# Patient Record
Sex: MALE | Race: WHITE | NOT HISPANIC OR LATINO | Employment: OTHER | URBAN - METROPOLITAN AREA
[De-identification: names, ages, dates, MRNs, and addresses within clinical notes are randomized per-mention and may not be internally consistent; named-entity substitution may affect disease eponyms.]

---

## 2024-05-08 ENCOUNTER — APPOINTMENT (OUTPATIENT)
Dept: RADIOLOGY | Facility: HOSPITAL | Age: 74
DRG: 439 | End: 2024-05-08
Payer: MEDICARE

## 2024-05-08 ENCOUNTER — APPOINTMENT (OUTPATIENT)
Dept: CARDIOLOGY | Facility: HOSPITAL | Age: 74
DRG: 439 | End: 2024-05-08
Payer: MEDICARE

## 2024-05-08 ENCOUNTER — HOSPITAL ENCOUNTER (INPATIENT)
Facility: HOSPITAL | Age: 74
LOS: 2 days | Discharge: HOME | DRG: 439 | End: 2024-05-10
Attending: STUDENT IN AN ORGANIZED HEALTH CARE EDUCATION/TRAINING PROGRAM | Admitting: INTERNAL MEDICINE
Payer: MEDICARE

## 2024-05-08 DIAGNOSIS — R10.84 GENERALIZED ABDOMINAL PAIN: ICD-10-CM

## 2024-05-08 DIAGNOSIS — R07.9 CHEST PAIN, UNSPECIFIED TYPE: Primary | ICD-10-CM

## 2024-05-08 DIAGNOSIS — K85.90 ACUTE PANCREATITIS, UNSPECIFIED COMPLICATION STATUS, UNSPECIFIED PANCREATITIS TYPE (HHS-HCC): ICD-10-CM

## 2024-05-08 PROBLEM — R10.9 ABDOMINAL PAIN: Status: ACTIVE | Noted: 2024-05-08

## 2024-05-08 LAB
ALBUMIN SERPL-MCNC: 4 G/DL (ref 3.5–5)
ALP BLD-CCNC: 87 U/L (ref 35–125)
ALT SERPL-CCNC: 20 U/L (ref 5–40)
ANION GAP SERPL CALC-SCNC: 12 MMOL/L
AST SERPL-CCNC: 16 U/L (ref 5–40)
BASOPHILS # BLD AUTO: 0.02 X10*3/UL (ref 0–0.1)
BASOPHILS NFR BLD AUTO: 0.3 %
BILIRUB DIRECT SERPL-MCNC: <0.2 MG/DL (ref 0–0.2)
BILIRUB SERPL-MCNC: 0.3 MG/DL (ref 0.1–1.2)
BUN SERPL-MCNC: 21 MG/DL (ref 8–25)
CALCIUM SERPL-MCNC: 9.6 MG/DL (ref 8.5–10.4)
CHLORIDE SERPL-SCNC: 97 MMOL/L (ref 97–107)
CO2 SERPL-SCNC: 26 MMOL/L (ref 24–31)
CREAT SERPL-MCNC: 1.6 MG/DL (ref 0.4–1.6)
EGFRCR SERPLBLD CKD-EPI 2021: 45 ML/MIN/1.73M*2
EOSINOPHIL # BLD AUTO: 0.31 X10*3/UL (ref 0–0.4)
EOSINOPHIL NFR BLD AUTO: 5 %
ERYTHROCYTE [DISTWIDTH] IN BLOOD BY AUTOMATED COUNT: 13.3 % (ref 11.5–14.5)
GLUCOSE SERPL-MCNC: 114 MG/DL (ref 65–99)
HCT VFR BLD AUTO: 44 % (ref 41–52)
HGB BLD-MCNC: 14.2 G/DL (ref 13.5–17.5)
IMM GRANULOCYTES # BLD AUTO: 0.02 X10*3/UL (ref 0–0.5)
IMM GRANULOCYTES NFR BLD AUTO: 0.3 % (ref 0–0.9)
LIPASE SERPL-CCNC: 19 U/L (ref 16–63)
LYMPHOCYTES # BLD AUTO: 1.33 X10*3/UL (ref 0.8–3)
LYMPHOCYTES NFR BLD AUTO: 21.6 %
MCH RBC QN AUTO: 30 PG (ref 26–34)
MCHC RBC AUTO-ENTMCNC: 32.3 G/DL (ref 32–36)
MCV RBC AUTO: 93 FL (ref 80–100)
MONOCYTES # BLD AUTO: 0.65 X10*3/UL (ref 0.05–0.8)
MONOCYTES NFR BLD AUTO: 10.5 %
NEUTROPHILS # BLD AUTO: 3.84 X10*3/UL (ref 1.6–5.5)
NEUTROPHILS NFR BLD AUTO: 62.3 %
NRBC BLD-RTO: 0 /100 WBCS (ref 0–0)
PLATELET # BLD AUTO: 139 X10*3/UL (ref 150–450)
POTASSIUM SERPL-SCNC: 4.5 MMOL/L (ref 3.4–5.1)
PROT SERPL-MCNC: 6.8 G/DL (ref 5.9–7.9)
RBC # BLD AUTO: 4.74 X10*6/UL (ref 4.5–5.9)
SODIUM SERPL-SCNC: 135 MMOL/L (ref 133–145)
TROPONIN T SERPL-MCNC: 25 NG/L
TROPONIN T SERPL-MCNC: 26 NG/L
WBC # BLD AUTO: 6.2 X10*3/UL (ref 4.4–11.3)

## 2024-05-08 PROCEDURE — 82248 BILIRUBIN DIRECT: CPT | Performed by: STUDENT IN AN ORGANIZED HEALTH CARE EDUCATION/TRAINING PROGRAM

## 2024-05-08 PROCEDURE — 99285 EMERGENCY DEPT VISIT HI MDM: CPT | Mod: 25

## 2024-05-08 PROCEDURE — 85025 COMPLETE CBC W/AUTO DIFF WBC: CPT | Performed by: STUDENT IN AN ORGANIZED HEALTH CARE EDUCATION/TRAINING PROGRAM

## 2024-05-08 PROCEDURE — 93005 ELECTROCARDIOGRAM TRACING: CPT

## 2024-05-08 PROCEDURE — 93010 ELECTROCARDIOGRAM REPORT: CPT | Performed by: INTERNAL MEDICINE

## 2024-05-08 PROCEDURE — 83690 ASSAY OF LIPASE: CPT | Performed by: STUDENT IN AN ORGANIZED HEALTH CARE EDUCATION/TRAINING PROGRAM

## 2024-05-08 PROCEDURE — 74177 CT ABD & PELVIS W/CONTRAST: CPT

## 2024-05-08 PROCEDURE — 1200000002 HC GENERAL ROOM WITH TELEMETRY DAILY

## 2024-05-08 PROCEDURE — 71046 X-RAY EXAM CHEST 2 VIEWS: CPT

## 2024-05-08 PROCEDURE — 71046 X-RAY EXAM CHEST 2 VIEWS: CPT | Performed by: RADIOLOGY

## 2024-05-08 PROCEDURE — 36415 COLL VENOUS BLD VENIPUNCTURE: CPT | Performed by: STUDENT IN AN ORGANIZED HEALTH CARE EDUCATION/TRAINING PROGRAM

## 2024-05-08 PROCEDURE — 84484 ASSAY OF TROPONIN QUANT: CPT | Performed by: STUDENT IN AN ORGANIZED HEALTH CARE EDUCATION/TRAINING PROGRAM

## 2024-05-08 PROCEDURE — 2550000001 HC RX 255 CONTRASTS: Performed by: STUDENT IN AN ORGANIZED HEALTH CARE EDUCATION/TRAINING PROGRAM

## 2024-05-08 PROCEDURE — 82374 ASSAY BLOOD CARBON DIOXIDE: CPT | Performed by: STUDENT IN AN ORGANIZED HEALTH CARE EDUCATION/TRAINING PROGRAM

## 2024-05-08 RX ORDER — SODIUM CHLORIDE 9 MG/ML
100 INJECTION, SOLUTION INTRAVENOUS CONTINUOUS
Status: DISCONTINUED | OUTPATIENT
Start: 2024-05-08 | End: 2024-05-09

## 2024-05-08 RX ORDER — ONDANSETRON HYDROCHLORIDE 2 MG/ML
4 INJECTION, SOLUTION INTRAVENOUS EVERY 6 HOURS PRN
Status: DISCONTINUED | OUTPATIENT
Start: 2024-05-08 | End: 2024-05-10 | Stop reason: HOSPADM

## 2024-05-08 RX ORDER — PANTOPRAZOLE SODIUM 40 MG/10ML
40 INJECTION, POWDER, LYOPHILIZED, FOR SOLUTION INTRAVENOUS DAILY
Status: DISCONTINUED | OUTPATIENT
Start: 2024-05-09 | End: 2024-05-10

## 2024-05-08 RX ORDER — MORPHINE SULFATE 2 MG/ML
2 INJECTION, SOLUTION INTRAMUSCULAR; INTRAVENOUS EVERY 4 HOURS PRN
Status: DISCONTINUED | OUTPATIENT
Start: 2024-05-08 | End: 2024-05-10 | Stop reason: HOSPADM

## 2024-05-08 RX ORDER — ERGOCALCIFEROL (VITAMIN D2) 200 MCG/ML
50000 DROPS ORAL
COMMUNITY

## 2024-05-08 RX ADMIN — IOHEXOL 75 ML: 350 INJECTION, SOLUTION INTRAVENOUS at 20:39

## 2024-05-08 ASSESSMENT — PAIN - FUNCTIONAL ASSESSMENT
PAIN_FUNCTIONAL_ASSESSMENT: 0-10
PAIN_FUNCTIONAL_ASSESSMENT: 0-10

## 2024-05-08 ASSESSMENT — PAIN SCALES - GENERAL
PAINLEVEL_OUTOF10: 0 - NO PAIN

## 2024-05-08 NOTE — ED PROVIDER NOTES
HPI   Chief Complaint   Patient presents with    Chest Pain       Patient is a 74-year-old male presenting to the emergency department for evaluation of chest pain.  Patient was reportedly at urgent care complaining of chest pain for the centimeter further evaluation.  Patient is denying any chest pain at this time.  He is a poor historian due to being mentally disabled and there is no one at bedside to help provide history.                            Bela Coma Scale Score: 14                     Patient History   No past medical history on file.  No past surgical history on file.  No family history on file.  Social History     Tobacco Use    Smoking status: Not on file    Smokeless tobacco: Not on file   Substance Use Topics    Alcohol use: Not on file    Drug use: Not on file       Physical Exam   ED Triage Vitals [05/08/24 1813]   Temp Heart Rate Respirations BP   -- 75 18 135/74      Pulse Ox Temp src Heart Rate Source Patient Position   96 % -- Monitor --      BP Location FiO2 (%)     -- --       Physical Exam  Vitals and nursing note reviewed.   Constitutional:       General: He is not in acute distress.     Appearance: He is well-developed. He is not ill-appearing or toxic-appearing.   HENT:      Head: Normocephalic and atraumatic.   Eyes:      Pupils: Pupils are equal, round, and reactive to light.   Cardiovascular:      Rate and Rhythm: Normal rate and regular rhythm.      Pulses:           Radial pulses are 2+ on the right side and 2+ on the left side.      Heart sounds: Normal heart sounds. No murmur heard.     No friction rub. No gallop.   Pulmonary:      Effort: Pulmonary effort is normal.      Breath sounds: Normal breath sounds. No decreased breath sounds, wheezing, rhonchi or rales.   Abdominal:      Palpations: Abdomen is soft.      Tenderness: There is no abdominal tenderness.   Musculoskeletal:         General: Normal range of motion.      Cervical back: Normal range of motion.      Right lower  leg: No edema.      Left lower leg: No edema.      Comments: Moving all extremities   Skin:     General: Skin is warm and dry.   Neurological:      General: No focal deficit present.      Mental Status: He is alert.      Comments: Cognitively impaired at baseline however is pleasant   Psychiatric:         Mood and Affect: Mood normal.         Behavior: Behavior normal.         ED Course & [facility]   ED Course as of 05/08/24 2106   Wed May 08, 2024   1925 EKG interpretation: Normal sinus rhythm, rate 67.  Leftward axis.  No ST or T wave abnormalities.  Inferior Q waves.  Compared with previous EKG dated 20 August 2020, no changes are seen. [ML]   2104 Troponin T Series, High Sensitivity (0, 2HR, 6HR)(!!) [AJ]      ED Course User Index  [AJ] Rosa El PA-C  [ML] Robson Velasco MD         Diagnoses as of 05/08/24 2106   Generalized abdominal pain       Medical Decision Making  **Disclaimer parts of this chart have been completed using voice recognition software. Please excuse any errors of transcription.     Patient seen in conjunction with attending physician Dr. Velasco    HPI: Detailed above.    Exam: A medically appropriate exam performed, outlined above, given the known history and presentation.    History obtained from: Patient    EKG: Reviewed and interpreted by my attending physician    Labs/Diagnostics:  Labs Reviewed   CBC WITH AUTO DIFFERENTIAL - Abnormal       Result Value    WBC 6.2      nRBC 0.0      RBC 4.74      Hemoglobin 14.2      Hematocrit 44.0      MCV 93      MCH 30.0      MCHC 32.3      RDW 13.3      Platelets 139 (*)     Neutrophils % 62.3      Immature Granulocytes %, Automated 0.3      Lymphocytes % 21.6      Monocytes % 10.5      Eosinophils % 5.0      Basophils % 0.3      Neutrophils Absolute 3.84      Immature Granulocytes Absolute, Automated 0.02      Lymphocytes Absolute 1.33      Monocytes Absolute 0.65      Eosinophils Absolute 0.31      Basophils Absolute 0.02     BASIC METABOLIC PANEL  - Abnormal    Glucose 114 (*)     Sodium 135      Potassium 4.5      Chloride 97      Bicarbonate 26      Urea Nitrogen 21      Creatinine 1.60      eGFR 45 (*)     Calcium 9.6      Anion Gap 12     SERIAL TROPONIN, INITIAL ([facility]) - Abnormal    Troponin T, High Sensitivity 25 (*)    LIPASE - Normal    Lipase 19     HEPATIC FUNCTION PANEL - Normal    AST 16      ALT 20      Alkaline Phosphatase 87      Bilirubin, Total 0.3      Bilirubin, Direct <0.2      Total Protein 6.8      Albumin 4.0     TROPONIN T SERIES, HIGH SENSITIVITY (0, 2 HR, 6 HR)    Narrative:     The following orders were created for panel order Troponin T Series, High Sensitivity (0, 2HR, 6HR).  Procedure                               Abnormality         Status                     ---------                               -----------         ------                     Serial Troponin, Initial...[[id number]]  Abnormal            Final result               Serial Troponin, 2 Hour ...[[id number]]                      In process                 Serial Troponin, 6 Hour ...[[id number]]                                                   Please view results for these tests on the individual orders.   SERIAL TROPONIN,  2 HOUR ([facility])   SERIAL TROPONIN, 6 HOUR ([facility])     XR chest 2 views   Final Result   Mild basilar subsegmental atelectasis.        MACRO:   None        Signed by: Conrado Haile 5/8/2024 7:13 PM   Dictation workstation:   [id number]      CT abdomen pelvis w IV contrast    (Results Pending)     EMERGENCY DEPARTMENT COURSE and DIFFERENTIAL DIAGNOSIS/MDM:  Patient is a 74-year-old male presenting to the emergency department for evaluation of chest pain.  On physical exam vital signs remarkable for systolic hypertension but otherwise stable patient is in no acute distress.  He is not complaining of any chest pain at this time however he is a poor historian.  Diagnostic labs and imaging ordered.  Chest x-ray showed mild basilar subsegmental atelectasis.   BMP showed no electrolyte abnormalities.  CBC showed no leukocytosis or anemia however did show platelets of 139.  Initial troponin 25.  Dr. Velasco spoke with the patient and he likely was explaining some pain in the abdominal region therefore CT of the abdomen and pelvis was ordered.  Lipase was also ordered which came back normal.  Repeat troponin and CT is pending at the time my departure.  Suspect if patient's second troponin comes back close to the same value as the initial troponin and there is no abnormality on CT scan that patient will be discharged to follow-up with primary care physician and cardiology outpatient.  Continuation of care as well as final disposition will be determined by attending physician in the emergency department.    Vitals:    Vitals:    05/08/24 1813   BP: 135/74   Pulse: 75   Resp: 18   SpO2: 96%   Weight: 104 kg (230 lb)   Height: 1.829 m (6')     History Limited by:    Cognitive impairment    Independent history obtained from:    Caregiver      External records reviewed:    Outpatient Note cardiology to determine patient's past medical history  Mr. Underwood is a 73 year old male with history of Schizophrenia, Parkinsonism, developmental delay, who presents for follow-up of pericardial effusion.      Euvolemic without abnormal auscultatory findings.      Last ECG with sinus bradycardia, old inferior.       Last TTE normal biventricular size and function. Interval resolution of pericardial effusion.         PLAN:  History of Pericardial effusion.   -  Denies chest pain.   - checking echocardiogram and ECG.     2. HTN  - continue current medications  - salt intake up to 2gr per day.   - weekly BP checks.  - Encourage aerobic activity.      RTC in one year with ECG and echocardiogram.    Diagnostics interpreted by me:    Xrays - see my independent interpretation in MDM      Discussions with other clinicians:    None      Chronic conditions impacting care:    Cognitively  impaired      Social determinants of health affecting care:    None    Diagnostic tests considered but not performed: None    ED Medications managed:    Medications   iohexol (OMNIPaque) 350 mg iodine/mL solution 75 mL (75 mL intravenous Given 5/8/24 2039)         Prescription drugs considered:    None      Procedure  Procedures     Rosa El PA-C  05/08/24 2035       Rosa El PA-C  05/08/24 2106

## 2024-05-08 NOTE — ED PROVIDER NOTES
Supervisory note:  Patient seen in conjunction with LACHELLE Francisco.  Patient presents with chest pain/abdominal pain.  He reportedly was having pain in the chest earlier today.  When asked patient about it, he points to his abdomen.  He runs his hand across his abdomen, saying there was pain earlier.  He reports that he does not have pain currently.  On examination, there is mild tenderness to palpation of the right lower quadrant, however examination is somewhat limited by BMI.  Cardiac and respiratory rates are unremarkable.    Troponin x 2 stable.  EKG without ischemic changes.  Laboratory studies otherwise unremarkable.  CAT scan reveals likely pancreatitis.  Patient is very poor historian and in the setting, patient accepted to hospitalist service for further management for pancreatitis.    I personally saw the patient and made/approved the management plan and take responsiblity for the patient management.  Parts of this chart were completed with dictation software, please excuse any errors in transcription.     Robson Velasco MD  05/08/24 3472

## 2024-05-08 NOTE — Clinical Note
One 200 mg vial propofol pulled from Twitter under this RN per verbal order from Dr. WENDIE Baker for procedure.  Count 10 in drawer prior to removal of vial.

## 2024-05-08 NOTE — ED TRIAGE NOTES
Ems called to urgent care for a male c/o chest pain. Pt brought here from there, but no chest pain upon arrival. Pt is developmentally delayed but alert and oriented per normal. Pt has no complaints at this time.

## 2024-05-09 ENCOUNTER — APPOINTMENT (OUTPATIENT)
Dept: CARDIOLOGY | Facility: HOSPITAL | Age: 74
DRG: 439 | End: 2024-05-09
Payer: MEDICARE

## 2024-05-09 ENCOUNTER — APPOINTMENT (OUTPATIENT)
Dept: RADIOLOGY | Facility: HOSPITAL | Age: 74
DRG: 439 | End: 2024-05-09
Payer: MEDICARE

## 2024-05-09 LAB
AORTIC VALVE PEAK VELOCITY: 1.17 M/S
ATRIAL RATE: 67 BPM
AV PEAK GRADIENT: 5.5 MMHG
AVA (PEAK VEL): 4.58 CM2
EJECTION FRACTION APICAL 4 CHAMBER: 68.3
GLUCOSE BLD MANUAL STRIP-MCNC: 117 MG/DL (ref 74–99)
GLUCOSE BLD MANUAL STRIP-MCNC: 119 MG/DL (ref 74–99)
GLUCOSE BLD MANUAL STRIP-MCNC: 121 MG/DL (ref 74–99)
GLUCOSE BLD MANUAL STRIP-MCNC: 134 MG/DL (ref 74–99)
GLUCOSE BLD MANUAL STRIP-MCNC: 152 MG/DL (ref 74–99)
LEFT VENTRICLE INTERNAL DIMENSION DIASTOLE: 4.46 CM (ref 3.5–6)
LEFT VENTRICULAR OUTFLOW TRACT DIAMETER: 2.2 CM
MITRAL VALVE E/A RATIO: 0.71
MITRAL VALVE E/E' RATIO: 14.16
P AXIS: 13 DEGREES
P OFFSET: 173 MS
P ONSET: 114 MS
PR INTERVAL: 196 MS
PSA SERPL-MCNC: 2.6 NG/ML
Q ONSET: 212 MS
QRS COUNT: 11 BEATS
QRS DURATION: 116 MS
QT INTERVAL: 404 MS
QTC CALCULATION(BAZETT): 426 MS
QTC FREDERICIA: 419 MS
R AXIS: -47 DEGREES
RIGHT VENTRICLE FREE WALL PEAK S': 11.9 CM/S
T AXIS: 14 DEGREES
T OFFSET: 414 MS
TRICUSPID ANNULAR PLANE SYSTOLIC EXCURSION: 1.8 CM
TROPONIN T SERPL-MCNC: 29 NG/L
VENTRICULAR RATE: 67 BPM

## 2024-05-09 PROCEDURE — 84484 ASSAY OF TROPONIN QUANT: CPT | Performed by: STUDENT IN AN ORGANIZED HEALTH CARE EDUCATION/TRAINING PROGRAM

## 2024-05-09 PROCEDURE — 93005 ELECTROCARDIOGRAM TRACING: CPT

## 2024-05-09 PROCEDURE — 2500000006 HC RX 250 W HCPCS SELF ADMINISTERED DRUGS (ALT 637 FOR ALL PAYERS): Mod: MUE | Performed by: INTERNAL MEDICINE

## 2024-05-09 PROCEDURE — 76376 3D RENDER W/INTRP POSTPROCES: CPT | Performed by: RADIOLOGY

## 2024-05-09 PROCEDURE — 74183 MRI ABD W/O CNTR FLWD CNTR: CPT | Performed by: RADIOLOGY

## 2024-05-09 PROCEDURE — 93306 TTE W/DOPPLER COMPLETE: CPT | Performed by: INTERNAL MEDICINE

## 2024-05-09 PROCEDURE — C9113 INJ PANTOPRAZOLE SODIUM, VIA: HCPCS | Performed by: INTERNAL MEDICINE

## 2024-05-09 PROCEDURE — 99222 1ST HOSP IP/OBS MODERATE 55: CPT | Performed by: INTERNAL MEDICINE

## 2024-05-09 PROCEDURE — A9575 INJ GADOTERATE MEGLUMI 0.1ML: HCPCS | Performed by: INTERNAL MEDICINE

## 2024-05-09 PROCEDURE — 93306 TTE W/DOPPLER COMPLETE: CPT

## 2024-05-09 PROCEDURE — 36415 COLL VENOUS BLD VENIPUNCTURE: CPT | Performed by: STUDENT IN AN ORGANIZED HEALTH CARE EDUCATION/TRAINING PROGRAM

## 2024-05-09 PROCEDURE — 82947 ASSAY GLUCOSE BLOOD QUANT: CPT

## 2024-05-09 PROCEDURE — 2550000001 HC RX 255 CONTRASTS: Performed by: INTERNAL MEDICINE

## 2024-05-09 PROCEDURE — 2500000004 HC RX 250 GENERAL PHARMACY W/ HCPCS (ALT 636 FOR OP/ED): Performed by: INTERNAL MEDICINE

## 2024-05-09 PROCEDURE — 84153 ASSAY OF PSA TOTAL: CPT | Performed by: NURSE PRACTITIONER

## 2024-05-09 PROCEDURE — 2500000001 HC RX 250 WO HCPCS SELF ADMINISTERED DRUGS (ALT 637 FOR MEDICARE OP)

## 2024-05-09 PROCEDURE — 2500000001 HC RX 250 WO HCPCS SELF ADMINISTERED DRUGS (ALT 637 FOR MEDICARE OP): Performed by: INTERNAL MEDICINE

## 2024-05-09 PROCEDURE — 1200000002 HC GENERAL ROOM WITH TELEMETRY DAILY

## 2024-05-09 PROCEDURE — 74183 MRI ABD W/O CNTR FLWD CNTR: CPT

## 2024-05-09 RX ORDER — INSULIN LISPRO 100 [IU]/ML
0-10 INJECTION, SOLUTION INTRAVENOUS; SUBCUTANEOUS
Status: DISCONTINUED | OUTPATIENT
Start: 2024-05-09 | End: 2024-05-10 | Stop reason: HOSPADM

## 2024-05-09 RX ORDER — METFORMIN HYDROCHLORIDE 500 MG/1
1 TABLET ORAL
COMMUNITY
Start: 2023-10-02

## 2024-05-09 RX ORDER — METOPROLOL TARTRATE 50 MG/1
50 TABLET ORAL 2 TIMES DAILY
Status: DISCONTINUED | OUTPATIENT
Start: 2024-05-09 | End: 2024-05-10 | Stop reason: HOSPADM

## 2024-05-09 RX ORDER — VALPROIC ACID 250 MG/5ML
1000 SOLUTION ORAL NIGHTLY
Status: DISCONTINUED | OUTPATIENT
Start: 2024-05-09 | End: 2024-05-10 | Stop reason: HOSPADM

## 2024-05-09 RX ORDER — TAMSULOSIN HYDROCHLORIDE 0.4 MG/1
0.4 CAPSULE ORAL NIGHTLY
COMMUNITY
Start: 2023-10-02

## 2024-05-09 RX ORDER — OLANZAPINE 5 MG/1
5 TABLET, ORALLY DISINTEGRATING ORAL DAILY PRN
COMMUNITY
Start: 2023-03-21

## 2024-05-09 RX ORDER — QUETIAPINE FUMARATE 100 MG/1
400 TABLET, FILM COATED ORAL NIGHTLY
Status: DISCONTINUED | OUTPATIENT
Start: 2024-05-09 | End: 2024-05-10 | Stop reason: HOSPADM

## 2024-05-09 RX ORDER — EZETIMIBE 10 MG/1
10 TABLET ORAL DAILY
Status: DISCONTINUED | OUTPATIENT
Start: 2024-05-09 | End: 2024-05-10 | Stop reason: HOSPADM

## 2024-05-09 RX ORDER — EZETIMIBE 10 MG/1
10 TABLET ORAL DAILY
COMMUNITY
Start: 2023-10-02

## 2024-05-09 RX ORDER — PANTOPRAZOLE SODIUM 40 MG/1
40 TABLET, DELAYED RELEASE ORAL
COMMUNITY
Start: 2024-01-25 | End: 2024-05-10 | Stop reason: HOSPADM

## 2024-05-09 RX ORDER — GADOTERATE MEGLUMINE 376.9 MG/ML
20 INJECTION INTRAVENOUS
Status: COMPLETED | OUTPATIENT
Start: 2024-05-09 | End: 2024-05-09

## 2024-05-09 RX ORDER — METOPROLOL TARTRATE 50 MG/1
50 TABLET ORAL 2 TIMES DAILY
COMMUNITY
Start: 2023-10-02

## 2024-05-09 RX ORDER — FLUTICASONE PROPIONATE 50 MCG
2 SPRAY, SUSPENSION (ML) NASAL DAILY
COMMUNITY
Start: 2016-07-22

## 2024-05-09 RX ORDER — ENOXAPARIN SODIUM 100 MG/ML
40 INJECTION SUBCUTANEOUS DAILY
Status: DISCONTINUED | OUTPATIENT
Start: 2024-05-09 | End: 2024-05-09

## 2024-05-09 RX ORDER — ALLOPURINOL 100 MG/1
100 TABLET ORAL DAILY
COMMUNITY
Start: 2023-10-02

## 2024-05-09 RX ORDER — CLONAZEPAM 0.5 MG/1
0.5 TABLET ORAL 2 TIMES DAILY
Status: DISCONTINUED | OUTPATIENT
Start: 2024-05-09 | End: 2024-05-10 | Stop reason: HOSPADM

## 2024-05-09 RX ORDER — LOSARTAN POTASSIUM 50 MG/1
50 TABLET ORAL DAILY
Status: DISCONTINUED | OUTPATIENT
Start: 2024-05-09 | End: 2024-05-10 | Stop reason: HOSPADM

## 2024-05-09 RX ORDER — OLANZAPINE 5 MG/1
5 TABLET ORAL EVERY MORNING
Status: DISCONTINUED | OUTPATIENT
Start: 2024-05-09 | End: 2024-05-10 | Stop reason: HOSPADM

## 2024-05-09 RX ORDER — OLANZAPINE 5 MG/1
1 TABLET ORAL EVERY MORNING
COMMUNITY
Start: 2023-03-29

## 2024-05-09 RX ORDER — PRAVASTATIN SODIUM 40 MG/1
40 TABLET ORAL NIGHTLY
Status: DISCONTINUED | OUTPATIENT
Start: 2024-05-09 | End: 2024-05-10 | Stop reason: HOSPADM

## 2024-05-09 RX ORDER — QUETIAPINE FUMARATE 400 MG/1
400 TABLET, FILM COATED ORAL NIGHTLY
COMMUNITY
Start: 2024-04-25

## 2024-05-09 RX ORDER — LOSARTAN POTASSIUM 50 MG/1
50 TABLET ORAL
COMMUNITY
Start: 2023-10-02

## 2024-05-09 RX ORDER — CARBIDOPA AND LEVODOPA 25; 100 MG/1; MG/1
37.5 TABLET ORAL 3 TIMES DAILY
COMMUNITY
Start: 2017-02-13

## 2024-05-09 RX ORDER — VALPROIC ACID 250 MG/5ML
500 SOLUTION ORAL EVERY MORNING
COMMUNITY
Start: 2016-05-07

## 2024-05-09 RX ORDER — PANTOPRAZOLE SODIUM 40 MG/1
40 TABLET, DELAYED RELEASE ORAL
Status: DISCONTINUED | OUTPATIENT
Start: 2024-05-09 | End: 2024-05-09

## 2024-05-09 RX ORDER — ACETAMINOPHEN 325 MG/1
650 TABLET ORAL EVERY 6 HOURS PRN
Status: DISCONTINUED | OUTPATIENT
Start: 2024-05-09 | End: 2024-05-10 | Stop reason: HOSPADM

## 2024-05-09 RX ORDER — GUAIFENESIN 600 MG/1
600 TABLET, EXTENDED RELEASE ORAL EVERY 12 HOURS PRN
COMMUNITY
Start: 2017-03-20

## 2024-05-09 RX ORDER — CARBIDOPA AND LEVODOPA 25; 100 MG/1; MG/1
37.5 TABLET ORAL 3 TIMES DAILY
Status: DISCONTINUED | OUTPATIENT
Start: 2024-05-09 | End: 2024-05-10 | Stop reason: HOSPADM

## 2024-05-09 RX ORDER — GUAIFENESIN 1200 MG
325 TABLET, EXTENDED RELEASE 12 HR ORAL EVERY 6 HOURS PRN
COMMUNITY

## 2024-05-09 RX ORDER — FUROSEMIDE 20 MG/1
20 TABLET ORAL DAILY
Status: DISCONTINUED | OUTPATIENT
Start: 2024-05-09 | End: 2024-05-10 | Stop reason: HOSPADM

## 2024-05-09 RX ORDER — CLONAZEPAM 0.5 MG/1
0.5 TABLET ORAL 3 TIMES DAILY
COMMUNITY
Start: 2016-05-08

## 2024-05-09 RX ORDER — VALPROIC ACID 250 MG/5ML
1000 SOLUTION ORAL NIGHTLY
COMMUNITY

## 2024-05-09 RX ORDER — OLANZAPINE 5 MG/1
5 TABLET, ORALLY DISINTEGRATING ORAL DAILY PRN
Status: DISCONTINUED | OUTPATIENT
Start: 2024-05-09 | End: 2024-05-10 | Stop reason: HOSPADM

## 2024-05-09 RX ORDER — PRAVASTATIN SODIUM 40 MG/1
40 TABLET ORAL NIGHTLY
COMMUNITY
Start: 2016-05-09

## 2024-05-09 RX ORDER — TAMSULOSIN HYDROCHLORIDE 0.4 MG/1
0.4 CAPSULE ORAL NIGHTLY
Status: DISCONTINUED | OUTPATIENT
Start: 2024-05-09 | End: 2024-05-10 | Stop reason: HOSPADM

## 2024-05-09 RX ORDER — POTASSIUM CHLORIDE 750 MG/1
10 TABLET, FILM COATED, EXTENDED RELEASE ORAL DAILY
Status: DISCONTINUED | OUTPATIENT
Start: 2024-05-09 | End: 2024-05-10 | Stop reason: HOSPADM

## 2024-05-09 RX ORDER — ALLOPURINOL 100 MG/1
100 TABLET ORAL DAILY
Status: DISCONTINUED | OUTPATIENT
Start: 2024-05-09 | End: 2024-05-10 | Stop reason: HOSPADM

## 2024-05-09 RX ORDER — POTASSIUM CHLORIDE 750 MG/1
10 TABLET, FILM COATED, EXTENDED RELEASE ORAL
COMMUNITY
Start: 2024-01-25

## 2024-05-09 RX ORDER — FUROSEMIDE 20 MG/1
20 TABLET ORAL DAILY
COMMUNITY
Start: 2023-10-02

## 2024-05-09 RX ORDER — DEXTROSE 50 % IN WATER (D50W) INTRAVENOUS SYRINGE
25
Status: DISCONTINUED | OUTPATIENT
Start: 2024-05-09 | End: 2024-05-10 | Stop reason: HOSPADM

## 2024-05-09 RX ORDER — PSYLLIUM SEED
1 PACKET (EA) ORAL
COMMUNITY
Start: 2023-12-15

## 2024-05-09 RX ORDER — VALPROIC ACID 250 MG/5ML
500 SOLUTION ORAL EVERY MORNING
Status: DISCONTINUED | OUTPATIENT
Start: 2024-05-09 | End: 2024-05-10 | Stop reason: HOSPADM

## 2024-05-09 RX ORDER — DEXTROSE 50 % IN WATER (D50W) INTRAVENOUS SYRINGE
12.5
Status: DISCONTINUED | OUTPATIENT
Start: 2024-05-09 | End: 2024-05-10 | Stop reason: HOSPADM

## 2024-05-09 RX ADMIN — CARBIDOPA AND LEVODOPA 1.5 TABLET: 25; 100 TABLET ORAL at 08:34

## 2024-05-09 RX ADMIN — PRAVASTATIN SODIUM 40 MG: 40 TABLET ORAL at 20:47

## 2024-05-09 RX ADMIN — OLANZAPINE 5 MG: 5 TABLET, FILM COATED ORAL at 08:37

## 2024-05-09 RX ADMIN — TAMSULOSIN HYDROCHLORIDE 0.4 MG: 0.4 CAPSULE ORAL at 03:11

## 2024-05-09 RX ADMIN — SODIUM CHLORIDE 100 ML/HR: 900 INJECTION, SOLUTION INTRAVENOUS at 00:20

## 2024-05-09 RX ADMIN — OLANZAPINE 5 MG: 5 TABLET, ORALLY DISINTEGRATING ORAL at 22:59

## 2024-05-09 RX ADMIN — PRAVASTATIN SODIUM 40 MG: 40 TABLET ORAL at 03:11

## 2024-05-09 RX ADMIN — EZETIMIBE 10 MG: 10 TABLET ORAL at 08:39

## 2024-05-09 RX ADMIN — GADOTERATE MEGLUMINE 20 ML: 376.9 INJECTION INTRAVENOUS at 20:25

## 2024-05-09 RX ADMIN — FUROSEMIDE 20 MG: 20 TABLET ORAL at 08:36

## 2024-05-09 RX ADMIN — ACETAMINOPHEN 650 MG: 325 TABLET ORAL at 06:47

## 2024-05-09 RX ADMIN — CARBIDOPA AND LEVODOPA 1.5 TABLET: 25; 100 TABLET ORAL at 20:47

## 2024-05-09 RX ADMIN — CLONAZEPAM 0.5 MG: 0.5 TABLET ORAL at 03:11

## 2024-05-09 RX ADMIN — TAMSULOSIN HYDROCHLORIDE 0.4 MG: 0.4 CAPSULE ORAL at 20:47

## 2024-05-09 RX ADMIN — LOSARTAN POTASSIUM 50 MG: 50 TABLET, FILM COATED ORAL at 08:36

## 2024-05-09 RX ADMIN — PSYLLIUM HUSK 1 PACKET: 3.4 POWDER ORAL at 12:15

## 2024-05-09 RX ADMIN — POTASSIUM CHLORIDE 10 MEQ: 750 TABLET, EXTENDED RELEASE ORAL at 08:38

## 2024-05-09 RX ADMIN — APIXABAN 5 MG: 5 TABLET, FILM COATED ORAL at 03:11

## 2024-05-09 RX ADMIN — METOPROLOL TARTRATE 50 MG: 50 TABLET, FILM COATED ORAL at 03:11

## 2024-05-09 RX ADMIN — QUETIAPINE FUMARATE 400 MG: 100 TABLET ORAL at 20:47

## 2024-05-09 RX ADMIN — METOPROLOL TARTRATE 50 MG: 50 TABLET, FILM COATED ORAL at 20:47

## 2024-05-09 RX ADMIN — VALPROIC ACID 1000 MG: 250 SOLUTION ORAL at 20:47

## 2024-05-09 RX ADMIN — PANTOPRAZOLE SODIUM 40 MG: 40 INJECTION, POWDER, FOR SOLUTION INTRAVENOUS at 10:21

## 2024-05-09 RX ADMIN — VALPROIC ACID 1000 MG: 250 SOLUTION ORAL at 03:40

## 2024-05-09 RX ADMIN — CLONAZEPAM 0.5 MG: 0.5 TABLET ORAL at 20:47

## 2024-05-09 RX ADMIN — VALPROIC ACID 500 MG: 250 SOLUTION ORAL at 08:38

## 2024-05-09 RX ADMIN — ALLOPURINOL 100 MG: 100 TABLET ORAL at 08:34

## 2024-05-09 RX ADMIN — QUETIAPINE FUMARATE 400 MG: 100 TABLET ORAL at 03:11

## 2024-05-09 SDOH — SOCIAL STABILITY: SOCIAL INSECURITY
WITHIN THE LAST YEAR, HAVE YOU BEEN KICKED, HIT, SLAPPED, OR OTHERWISE PHYSICALLY HURT BY YOUR PARTNER OR EX-PARTNER?: NO

## 2024-05-09 SDOH — ECONOMIC STABILITY: HOUSING INSECURITY
IN THE LAST 12 MONTHS, WAS THERE A TIME WHEN YOU DID NOT HAVE A STEADY PLACE TO SLEEP OR SLEPT IN A SHELTER (INCLUDING NOW)?: NO

## 2024-05-09 SDOH — SOCIAL STABILITY: SOCIAL INSECURITY: WITHIN THE LAST YEAR, HAVE YOU BEEN AFRAID OF YOUR PARTNER OR EX-PARTNER?: NO

## 2024-05-09 SDOH — HEALTH STABILITY: MENTAL HEALTH
STRESS IS WHEN SOMEONE FEELS TENSE, NERVOUS, ANXIOUS, OR CAN'T SLEEP AT NIGHT BECAUSE THEIR MIND IS TROUBLED. HOW STRESSED ARE YOU?: PATIENT UNABLE TO ANSWER

## 2024-05-09 SDOH — SOCIAL STABILITY: SOCIAL INSECURITY: WITHIN THE LAST YEAR, HAVE YOU BEEN HUMILIATED OR EMOTIONALLY ABUSED IN OTHER WAYS BY YOUR PARTNER OR EX-PARTNER?: NO

## 2024-05-09 SDOH — SOCIAL STABILITY: SOCIAL INSECURITY: DO YOU FEEL UNSAFE GOING BACK TO THE PLACE WHERE YOU ARE LIVING?: UNABLE TO ASSESS

## 2024-05-09 SDOH — HEALTH STABILITY: PHYSICAL HEALTH: ON AVERAGE, HOW MANY DAYS PER WEEK DO YOU ENGAGE IN MODERATE TO STRENUOUS EXERCISE (LIKE A BRISK WALK)?: 0 DAYS

## 2024-05-09 SDOH — SOCIAL STABILITY: SOCIAL NETWORK: ARE YOU MARRIED, WIDOWED, DIVORCED, SEPARATED, NEVER MARRIED, OR LIVING WITH A PARTNER?: NEVER MARRIED

## 2024-05-09 SDOH — HEALTH STABILITY: MENTAL HEALTH: HOW OFTEN DO YOU HAVE 6 OR MORE DRINKS ON ONE OCCASION?: NEVER

## 2024-05-09 SDOH — HEALTH STABILITY: PHYSICAL HEALTH: ON AVERAGE, HOW MANY MINUTES DO YOU ENGAGE IN EXERCISE AT THIS LEVEL?: 0 MIN

## 2024-05-09 SDOH — ECONOMIC STABILITY: TRANSPORTATION INSECURITY
IN THE PAST 12 MONTHS, HAS THE LACK OF TRANSPORTATION KEPT YOU FROM MEDICAL APPOINTMENTS OR FROM GETTING MEDICATIONS?: NO

## 2024-05-09 SDOH — SOCIAL STABILITY: SOCIAL INSECURITY: HAVE YOU HAD THOUGHTS OF HARMING ANYONE ELSE?: NO

## 2024-05-09 SDOH — SOCIAL STABILITY: SOCIAL INSECURITY: ABUSE: ADULT

## 2024-05-09 SDOH — SOCIAL STABILITY: SOCIAL INSECURITY
WITHIN THE LAST YEAR, HAVE TO BEEN RAPED OR FORCED TO HAVE ANY KIND OF SEXUAL ACTIVITY BY YOUR PARTNER OR EX-PARTNER?: NO

## 2024-05-09 SDOH — ECONOMIC STABILITY: INCOME INSECURITY: IN THE PAST 12 MONTHS, HAS THE ELECTRIC, GAS, OIL, OR WATER COMPANY THREATENED TO SHUT OFF SERVICE IN YOUR HOME?: NO

## 2024-05-09 SDOH — SOCIAL STABILITY: SOCIAL NETWORK: IN A TYPICAL WEEK, HOW MANY TIMES DO YOU TALK ON THE PHONE WITH FAMILY, FRIENDS, OR NEIGHBORS?: PATIENT UNABLE TO ANSWER

## 2024-05-09 SDOH — ECONOMIC STABILITY: INCOME INSECURITY: IN THE LAST 12 MONTHS, WAS THERE A TIME WHEN YOU WERE NOT ABLE TO PAY THE MORTGAGE OR RENT ON TIME?: NO

## 2024-05-09 SDOH — ECONOMIC STABILITY: FOOD INSECURITY: WITHIN THE PAST 12 MONTHS, YOU WORRIED THAT YOUR FOOD WOULD RUN OUT BEFORE YOU GOT MONEY TO BUY MORE.: NEVER TRUE

## 2024-05-09 SDOH — ECONOMIC STABILITY: FOOD INSECURITY: WITHIN THE PAST 12 MONTHS, THE FOOD YOU BOUGHT JUST DIDN'T LAST AND YOU DIDN'T HAVE MONEY TO GET MORE.: NEVER TRUE

## 2024-05-09 SDOH — SOCIAL STABILITY: SOCIAL NETWORK
DO YOU BELONG TO ANY CLUBS OR ORGANIZATIONS SUCH AS CHURCH GROUPS UNIONS, FRATERNAL OR ATHLETIC GROUPS, OR SCHOOL GROUPS?: NO

## 2024-05-09 SDOH — ECONOMIC STABILITY: TRANSPORTATION INSECURITY
IN THE PAST 12 MONTHS, HAS LACK OF TRANSPORTATION KEPT YOU FROM MEETINGS, WORK, OR FROM GETTING THINGS NEEDED FOR DAILY LIVING?: NO

## 2024-05-09 SDOH — HEALTH STABILITY: MENTAL HEALTH: HOW MANY STANDARD DRINKS CONTAINING ALCOHOL DO YOU HAVE ON A TYPICAL DAY?: PATIENT DOES NOT DRINK

## 2024-05-09 SDOH — HEALTH STABILITY: MENTAL HEALTH: HOW OFTEN DO YOU HAVE A DRINK CONTAINING ALCOHOL?: NEVER

## 2024-05-09 SDOH — HEALTH STABILITY: MENTAL HEALTH
HOW OFTEN DO YOU NEED TO HAVE SOMEONE HELP YOU WHEN YOU READ INSTRUCTIONS, PAMPHLETS, OR OTHER WRITTEN MATERIAL FROM YOUR DOCTOR OR PHARMACY?: ALWAYS

## 2024-05-09 SDOH — SOCIAL STABILITY: SOCIAL INSECURITY: ARE THERE ANY APPARENT SIGNS OF INJURIES/BEHAVIORS THAT COULD BE RELATED TO ABUSE/NEGLECT?: UNABLE TO ASSESS

## 2024-05-09 SDOH — SOCIAL STABILITY: SOCIAL INSECURITY: HAS ANYONE EVER THREATENED TO HURT YOUR FAMILY OR YOUR PETS?: UNABLE TO ASSESS

## 2024-05-09 SDOH — SOCIAL STABILITY: SOCIAL INSECURITY: HAVE YOU HAD ANY THOUGHTS OF HARMING ANYONE ELSE?: UNABLE TO ASSESS

## 2024-05-09 SDOH — SOCIAL STABILITY: SOCIAL NETWORK: HOW OFTEN DO YOU GET TOGETHER WITH FRIENDS OR RELATIVES?: PATIENT UNABLE TO ANSWER

## 2024-05-09 SDOH — SOCIAL STABILITY: SOCIAL INSECURITY: DOES ANYONE TRY TO KEEP YOU FROM HAVING/CONTACTING OTHER FRIENDS OR DOING THINGS OUTSIDE YOUR HOME?: UNABLE TO ASSESS

## 2024-05-09 SDOH — SOCIAL STABILITY: SOCIAL INSECURITY: WERE YOU ABLE TO COMPLETE ALL THE BEHAVIORAL HEALTH SCREENINGS?: NO

## 2024-05-09 SDOH — SOCIAL STABILITY: SOCIAL INSECURITY: ARE YOU OR HAVE YOU BEEN THREATENED OR ABUSED PHYSICALLY, EMOTIONALLY, OR SEXUALLY BY ANYONE?: UNABLE TO ASSESS

## 2024-05-09 SDOH — SOCIAL STABILITY: SOCIAL INSECURITY: DO YOU FEEL ANYONE HAS EXPLOITED OR TAKEN ADVANTAGE OF YOU FINANCIALLY OR OF YOUR PERSONAL PROPERTY?: UNABLE TO ASSESS

## 2024-05-09 SDOH — SOCIAL STABILITY: SOCIAL NETWORK: HOW OFTEN DO YOU ATTEND CHURCH OR RELIGIOUS SERVICES?: NEVER

## 2024-05-09 SDOH — ECONOMIC STABILITY: INCOME INSECURITY: HOW HARD IS IT FOR YOU TO PAY FOR THE VERY BASICS LIKE FOOD, HOUSING, MEDICAL CARE, AND HEATING?: NOT HARD AT ALL

## 2024-05-09 SDOH — SOCIAL STABILITY: SOCIAL NETWORK: HOW OFTEN DO YOU ATTENT MEETINGS OF THE CLUB OR ORGANIZATION YOU BELONG TO?: NEVER

## 2024-05-09 ASSESSMENT — LIFESTYLE VARIABLES
HOW OFTEN DO YOU HAVE A DRINK CONTAINING ALCOHOL: NEVER
HOW MANY STANDARD DRINKS CONTAINING ALCOHOL DO YOU HAVE ON A TYPICAL DAY: PATIENT DOES NOT DRINK
SKIP TO QUESTIONS 9-10: 1
AUDIT-C TOTAL SCORE: 0
SKIP TO QUESTIONS 9-10: 1
AUDIT-C TOTAL SCORE: 0
SUBSTANCE_ABUSE_PAST_12_MONTHS: NO
AUDIT-C TOTAL SCORE: 0
PRESCIPTION_ABUSE_PAST_12_MONTHS: NO
HOW OFTEN DO YOU HAVE 6 OR MORE DRINKS ON ONE OCCASION: NEVER

## 2024-05-09 ASSESSMENT — COGNITIVE AND FUNCTIONAL STATUS - GENERAL
HELP NEEDED FOR BATHING: A LITTLE
MOBILITY SCORE: 20
WALKING IN HOSPITAL ROOM: A LOT
CLIMB 3 TO 5 STEPS WITH RAILING: A LOT
DRESSING REGULAR LOWER BODY CLOTHING: A LITTLE
DAILY ACTIVITIY SCORE: 20
MOVING TO AND FROM BED TO CHAIR: A LITTLE
WALKING IN HOSPITAL ROOM: A LITTLE
STANDING UP FROM CHAIR USING ARMS: A LITTLE
DRESSING REGULAR UPPER BODY CLOTHING: A LITTLE
HELP NEEDED FOR BATHING: A LITTLE
MOVING TO AND FROM BED TO CHAIR: A LITTLE
TOILETING: A LITTLE
MOBILITY SCORE: 16
DRESSING REGULAR LOWER BODY CLOTHING: A LITTLE
MOVING FROM LYING ON BACK TO SITTING ON SIDE OF FLAT BED WITH BEDRAILS: A LITTLE
PERSONAL GROOMING: A LITTLE
PATIENT BASELINE BEDBOUND: NO
DRESSING REGULAR UPPER BODY CLOTHING: A LITTLE
CLIMB 3 TO 5 STEPS WITH RAILING: A LITTLE
TURNING FROM BACK TO SIDE WHILE IN FLAT BAD: A LITTLE
STANDING UP FROM CHAIR USING ARMS: A LITTLE
DAILY ACTIVITIY SCORE: 19
TOILETING: A LITTLE

## 2024-05-09 ASSESSMENT — ENCOUNTER SYMPTOMS
FEVER: 0
NAUSEA: 0
CHILLS: 0
ABDOMINAL PAIN: 1
CONSTIPATION: 0
DIARRHEA: 0
VOMITING: 0

## 2024-05-09 ASSESSMENT — ACTIVITIES OF DAILY LIVING (ADL)
ADEQUATE_TO_COMPLETE_ADL: YES
WALKS IN HOME: INDEPENDENT
LACK_OF_TRANSPORTATION: NO
TOILETING: NEEDS ASSISTANCE
PATIENT'S MEMORY ADEQUATE TO SAFELY COMPLETE DAILY ACTIVITIES?: YES
HEARING - RIGHT EAR: FUNCTIONAL
DRESSING YOURSELF: NEEDS ASSISTANCE
JUDGMENT_ADEQUATE_SAFELY_COMPLETE_DAILY_ACTIVITIES: YES
GROOMING: NEEDS ASSISTANCE
BATHING: NEEDS ASSISTANCE
FEEDING YOURSELF: INDEPENDENT
HEARING - LEFT EAR: FUNCTIONAL
LACK_OF_TRANSPORTATION: NO

## 2024-05-09 ASSESSMENT — PATIENT HEALTH QUESTIONNAIRE - PHQ9
1. LITTLE INTEREST OR PLEASURE IN DOING THINGS: NOT AT ALL
SUM OF ALL RESPONSES TO PHQ9 QUESTIONS 1 & 2: 0
2. FEELING DOWN, DEPRESSED OR HOPELESS: NOT AT ALL

## 2024-05-09 ASSESSMENT — PAIN SCALES - GENERAL
PAINLEVEL_OUTOF10: 0 - NO PAIN
PAINLEVEL_OUTOF10: 0 - NO PAIN

## 2024-05-09 ASSESSMENT — PAIN - FUNCTIONAL ASSESSMENT
PAIN_FUNCTIONAL_ASSESSMENT: 0-10

## 2024-05-09 ASSESSMENT — PAIN DESCRIPTION - LOCATION: LOCATION: HEAD

## 2024-05-09 NOTE — CONSULTS
"Consults  History Of Present Illness:    Nikhil Underwood is a 74 y.o. male presenting with epigastric pain and chest pain.  He is a poor historian with a history of MRDD and schizophrenia and after complaining in his group home that he was having epigastric pain he was hospitalized.  He has a history of diastolic heart failure chronic kidney disease stage III essential hypertension, previous pericardial effusion  July 24, 2020 hyperlipidemia MRDD paroxysmal atrial fibrillation Parkinson's disease schizophrenia and a history of pericardial effusion and he follows cardiologist Dr. Clifford Escobar    He had GI consultation yesterday noting a history of chronic dysphagia and CT of his abdomen/pelvis showing a para esophageal mass as well as abnormal pancreas that may have represented either a mass or necrotizing pancreatitis and this is in the setting of normal liver function tests and lipase and no recent EGD.  The interpreting radiologist from his 5/8/2024 exam notes the following :  \"As described in the radiology report, there is a 2.8 cm x 1.3 cm  density adjacent to the distal esophagus which is stable in size  comparison to the 4/7/2023 CT examination. On review of the 7/24/2020  CT chest examination, the patient previously had a large pericardial  effusion and there was hyperdense fluid in this region on the remote  prior CT chest examination, and this finding may correspond to mild  residual loculated complex fluid. Attention on progress imaging is  however recommended to exclude other underlying pathology including  enlarged lymph node.     Last Recorded Vitals:  Vitals:    05/08/24 2111 05/09/24 0020 05/09/24 0245 05/09/24 0718   BP: 130/68 141/69 144/81 150/80   BP Location:  Left arm Left arm Left arm   Patient Position:  Lying Lying Lying   Pulse: 70 68 70 67   Resp: 18 18 18 19   Temp:  36.3 °C (97.3 °F) 36.9 °C (98.4 °F) 36.4 °C (97.5 °F)   TempSrc:  Oral Oral Oral   SpO2: 98% 98% 97% 95%   Weight:     "   Height:           Last Labs:  CBC - 5/8/2024:  6:42 PM  6.2 14.2 139    44.0      CMP - 5/8/2024:  6:42 PM  9.6 6.8 16 --- 0.3   _ 4.0 20 87      PTT - No results in last year.  _   _ _     BNP   Date/Time Value Ref Range Status   08/28/2020 12:13  (H) 0 - 99 pg/mL Final     Comment:     .  <100 pg/mL - Heart failure unlikely  100-299 pg/mL - Intermediate probability of acute heart  .               failure exacerbation. Correlate with clinical  .               context and patient history.    >=300 pg/mL - Heart Failure likely. Correlate with clinical  .               context and patient history.  BNP testing is performed using different testing   methodology at [facility] than at other   system hospitals. Direct result comparisons should   only be made within the same method.     07/25/2020 07:52  (H) 0 - 99 pg/mL Final     Comment:     .  <100 pg/mL - Heart failure unlikely  100-299 pg/mL - Intermediate probability of acute heart  .               failure exacerbation. Correlate with clinical  .               context and patient history.    >=300 pg/mL - Heart Failure likely. Correlate with clinical  .               context and patient history.  BNP testing is performed using different testing   methodology at [facility] than at other   system hospitals. Direct result comparisons should   only be made within the same method.       Hemoglobin A1C   Date/Time Value Ref Range Status   11/07/2023 08:49 AM 6.5 (H) 4.3 - 5.6 % Final     Comment:     American Diabetes Association guidelines indicate that patients with HgbA1c in the range 5.7-6.4% are at increased risk for development of diabetes, and intervention by lifestyle modification may be beneficial. HgbA1c greater or equal to 6.5% is considered diagnostic of diabetes.   04/18/2023 09:45 AM 6.3 (H) 4.3 - 5.6 % Final     Comment:     American Diabetes Association guidelines indicate that patients with HgbA1c in the range  "5.7-6.4% are at increased risk for development of diabetes, and intervention by lifestyle modification may be beneficial. HgbA1c greater or equal to 6.5% is considered diagnostic of diabetes.      Last I/O:  I/O last 3 completed shifts:  In: 773.3 (7.4 mL/kg) [P.O.:480; I.V.:293.3 (2.8 mL/kg)]  Out: 950 (9.1 mL/kg) [Urine:950 (0.3 mL/kg/hr)]  Weight: 104.3 kg     Past Cardiology Tests (Last 3 Years):  EKG:  ECG 12 Lead 05/08/2024    Echo:  No results found for this or any previous visit from the past 1095 days.    Ejection Fractions:  No results found for: \"EF\"  Cath:  No results found for this or any previous visit from the past 1095 days.    Stress Test:  Nuclear Stress Test 07/06/2021    Cardiac Imaging:  No results found for this or any previous visit from the past 1095 days.      Past Medical History:  He has no past medical history on file.    Past Surgical History:  He has no past surgical history on file.      Social History:  He reports that he has never smoked. He has never used smokeless tobacco. He reports that he does not drink alcohol and does not use drugs.    Family History:  No family history on file.     Allergies:  Patient has no known allergies.    Inpatient Medications:  Scheduled medications   Medication Dose Route Frequency    allopurinol  100 mg oral Daily    apixaban  5 mg oral BID    carbidopa-levodopa  37.5 mg oral TID    cariprazine  6 mg oral Daily    clonazePAM  0.5 mg oral BID    ezetimibe  10 mg oral Daily    furosemide  20 mg oral Daily    insulin lispro  0-10 Units subcutaneous TID with meals    losartan  50 mg oral Daily    metoprolol tartrate  50 mg oral BID    OLANZapine  5 mg oral q AM    pantoprazole  40 mg intravenous Daily    potassium chloride CR  10 mEq oral Daily    pravastatin  40 mg oral Nightly    psyllium  1 packet oral Daily    QUEtiapine  400 mg oral Nightly    tamsulosin  0.4 mg oral Nightly    valproic acid  1,000 mg oral Nightly    valproic acid  500 mg oral q AM "     PRN medications   Medication    acetaminophen    dextrose    dextrose    glucagon    glucagon    morphine    OLANZapine zydis    ondansetron     Continuous Medications   Medication Dose Last Rate     Outpatient Medications:  Current Outpatient Medications   Medication Instructions    acetaminophen (TYLENOL) 325 mg, oral, Every 6 hours PRN    allopurinol (ZYLOPRIM) 100 mg, oral, Daily    apixaban (ELIQUIS) 5 mg, oral, 2 times daily    carbidopa-levodopa (Sinemet)  mg tablet 37.5 mg, oral, 3 times daily    cariprazine (VRAYLAR) 6 mg, oral, Daily RT    clonazePAM (KLONOPIN) 0.5 mg, oral, 3 times daily    ergocalciferol (VITAMIN D-2) 50,000 Units, oral, Once Weekly    ezetimibe (ZETIA) 10 mg, oral, Daily    fluticasone (Flonase) 50 mcg/actuation nasal spray 2 sprays, Each Nostril, Daily    furosemide (LASIX) 20 mg, oral, Daily    guaiFENesin (MUCINEX) 600 mg, oral, Every 12 hours PRN    LACTOBACILLUS ACIDOPHILUS ORAL 2 capsules, oral, 2 times daily    losartan (COZAAR) 50 mg, oral, Daily RT    metFORMIN (Glucophage) 500 mg tablet 1 tablet, oral, Daily with evening meal    metoprolol tartrate (LOPRESSOR) 50 mg, oral, 2 times daily    OLANZapine (ZyPREXA) 5 mg tablet 1 tablet, oral, Every morning    OLANZapine zydis (ZYPREXA) 5 mg, oral, Daily PRN    pantoprazole (PROTONIX) 40 mg, oral, Daily before breakfast    potassium chloride CR 10 mEq ER tablet 10 mEq, oral, Daily RT    pravastatin (PRAVACHOL) 40 mg, oral, Nightly    psyllium husk (MetamuciL) 3.4 gram/5.4 gram powder 1 tsp, oral, Daily RT    QUEtiapine (SEROQUEL) 400 mg, oral, Nightly    tamsulosin (FLOMAX) 0.4 mg, oral, Nightly    valproate (DEPAKENE) 500 mg, oral, Every morning    valproate (DEPAKENE) 1,000 mg, oral, Nightly       Physical Exam:  Neck:  Normal jugular venous pressure, carotid upstrokes brisk with no bruits  Lungs:  Clear to auscultation without wheezing or rhonchi or rales  Heart:  Regular rate and rhythm normal S1 and S2, no murmurs  gallops rubs or clicks, PMI normal, no RV lift  Abdomen:  Soft, good bowel sounds, abdominal tenderness in the upper abdomen on palpation ,no hepatosplenomegaly, no pulsatile mass or bruits.  Extremities:  Good radial, femoral, pedal pulses without pretibial edema  Neurological:  No focal sensory or motor deficits,  Baseline Mental status in the setting of MRDD  Assessment/Plan   #1 possible esophageal mass with chronic dysphagia    2.  Possible pancreatic mass versus necrotizing pancreatitis in the setting of normal liver function tests and normal lipase    3.  History of enlarged prostate    4. noncardiac chest discomfort.    5/9: He is afebrile, pulse is 67, blood pressure is 150/80, respirations 19, O2 saturation is 95% on room air.  He is resting comfortably and will have EGD today and also GI service is planning on MRI of his abdomen.  His lab work shows normal troponin levels of 25, 26, 29 and a flat pattern that are not consistent with any acute coronary syndrome.  We will follow him with you as needed from a cardiac standpoint.       Code Status:  No Order    I spent 35  minutes in the professional and overall care of this patient.        Michael Kate, DO

## 2024-05-09 NOTE — CARE PLAN
The patient's goals for the shift include Rest; remain free from pain/injury/falls    The clinical goals for the shift include Maintain pt safety/comfort; monitor labs/vitals; pain management    Over the shift, the patient did not make progress toward the following goals. Barriers to progression include . Recommendations to address these barriers include   Problem: Pain - Adult  Goal: Verbalizes/displays adequate comfort level or baseline comfort level  Outcome: Progressing     Problem: Safety - Adult  Goal: Free from fall injury  Outcome: Progressing     Problem: Fall/Injury  Goal: Not fall by end of shift  Outcome: Progressing  Goal: Be free from injury by end of the shift  Outcome: Progressing  Goal: Verbalize understanding of personal risk factors for fall in the hospital  Outcome: Progressing  Goal: Verbalize understanding of risk factor reduction measures to prevent injury from fall in the home  Outcome: Progressing  Goal: Use assistive devices by end of the shift  Outcome: Progressing  Goal: Pace activities to prevent fatigue by end of the shift  Outcome: Progressing   .

## 2024-05-09 NOTE — PROGRESS NOTES
05/09/24 1731   [facility] Disability Status   Are you deaf or do you have serious difficulty hearing? N   Are you blind or do you have serious difficulty seeing, even when wearing glasses? N   Because of a physical, mental, or emotional condition, do you have serious difficulty concentrating, remembering, or making decisions? (5 years old or older) Y   Do you have serious difficulty walking or climbing stairs? Y   Do you have serious difficulty dressing or bathing? Y   Because of a physical, mental, or emotional condition, do you have serious difficulty doing errands alone such as visiting the doctor? Y

## 2024-05-09 NOTE — PROGRESS NOTES
Nikhil Underwood is a 74 y.o. male on day 1 of admission presenting with Abdominal pain.      Subjective   Pt with MRDD admitted last pm for epigastric/chest pain. CT showed possible esophageal mass along with possible pancreatic mass. He is currently NPO and awaiting MRCP. Denies pain at this time. Caregiver is at bedside.        Objective     Last Recorded Vitals  /80 (BP Location: Left arm, Patient Position: Lying)   Pulse 67   Temp 36.4 °C (97.5 °F) (Oral)   Resp 19   Wt 104 kg (230 lb)   SpO2 95%   Intake/Output last 3 Shifts:    Intake/Output Summary (Last 24 hours) at 5/9/2024 1152  Last data filed at 5/9/2024 1100  Gross per 24 hour   Intake 1253.33 ml   Output 1825 ml   Net -571.67 ml       Admission Weight  Weight: 104 kg (230 lb) (05/08/24 1813)    Daily Weight  05/08/24 : 104 kg (230 lb)    Image Results  ECG 12 Lead    Result Date: 5/9/2024  Normal sinus rhythm Possible Left atrial enlargement Left axis deviation Inferior infarct (cited on or before 28-AUG-2020) Abnormal ECG When compared with ECG of 28-AUG-2020 12:42, Inferior and lateral ST abnormality no longer evident Confirmed by Cody Corado ([id number]) on 5/9/2024 7:48:59 AM    CT abdomen pelvis w IV contrast    Addendum Date: 5/8/2024    Interpreted By:  Conrado Haile, ADDENDUM: As described in the radiology report, there is a 2.8 cm x 1.3 cm density adjacent to the distal esophagus which is stable in size comparison to the 4/7/2023 CT examination. On review of the 7/24/2020 CT chest examination, the patient previously had a large pericardial effusion and there was hyperdense fluid in this region on the remote prior CT chest examination, and this finding may correspond to mild residual loculated complex fluid. Attention on progress imaging is however recommended to exclude other underlying pathology including enlarged lymph node.     Signed by: Conrado Haile 5/8/2024 11:26 PM   -------- ORIGINAL REPORT -------- Dictation workstation:    [id number]    Result Date: 5/8/2024  Interpreted By:  Conrado Haile, STUDY: CT ABDOMEN PELVIS W IV CONTRAST;  5/8/2024 8:52 pm   INDICATION: Signs/Symptoms:diffuse abd pain.   COMPARISON: 4/7/2023   ACCESSION NUMBER(S): [id number]   ORDERING CLINICIAN: SHARON HOLT   TECHNIQUE: Contiguous axial images of the abdomen and pelvis were obtained after the intravenous administration of  contrast. Coronal and sagittal reformatted images were obtained from the axial images.   FINDINGS: There is limited evaluation of the lung bases. Bibasilar subsegmental atelectasis. No pleural effusion.   Stable 2.8 cm x 1.2 cm opacity along the right aspect of the distal esophagus.   There is hepatic steatosis. The gallbladder is present. No dilatation common bile duct.   There is edema and expansion of the distal pancreatic body and tail with hypoenhancement. There is mild edema adjacent to the pancreatic tail. 10 mm soft tissue nodular density to the pancreatic tail and spleen.   2.2 cm cystic lesion in the spleen.   The adrenal glands appear unremarkable.   There is atrophy and cortical scarring of the kidneys. There are several renal cysts and subcentimeter hypodensities too small to characterize. 7 mm nonobstructive left renal calculus. No hydronephrosis.   No evidence of bowel obstruction or acute appendicitis.   Urinary bladder appears unremarkable.   Prostatomegaly.   Multilevel bridging anterior osseous spurring of the lumbar spine.       Expansion of the distal pancreatic body and tail with hypoenhancement and mild surrounding edema, findings may relate to pancreatitis with necrotizing pancreatitis not excluded. Follow-up MRI is however recommended for further evaluation and to exclude other pathology including mass. 10 mm soft tissue nodular density between the pancreatic tail and spleen may correspond to a lymph node.   Bilateral renal atrophy and cortical scarring and small renal cysts and subcentimeter hypodensities too  small to characterize. 7 mm nonobstructive left renal calculus.   Prostatomegaly; please correlate with PSA.   MACRO: Critical Finding:  See findings. Notification was initiated on 5/8/2024 at 9:33 pm by  Conrado Haile.  (**-YCF-**) Instructions:   Signed by: Conrado Haile 5/8/2024 9:33 PM Dictation workstation:   [id number]    XR chest 2 views    Result Date: 5/8/2024  Interpreted By:  Conrado Haile, STUDY: XR CHEST 2 VIEWS;  5/8/2024 6:55 pm   INDICATION: Signs/Symptoms:chest pain.   COMPARISON: 9/22/2020   ACCESSION NUMBER(S): [id number]   ORDERING CLINICIAN: SHARON HOLT   FINDINGS: The cardiac silhouette is stable in size. There is mild basilar subsegmental atelectasis. No significant pleural effusion. No pneumothorax. Diffuse multilevel bridging anterior osseous spurring of the thoracic spine.       Mild basilar subsegmental atelectasis.   MACRO: None   Signed by: Conrado Haile 5/8/2024 7:13 PM Dictation workstation:   [id number]           Physical Exam  HENT:      Head: Normocephalic and atraumatic.      Nose: Nose normal.      Mouth/Throat:      Mouth: Mucous membranes are moist.      Pharynx: Oropharynx is clear.   Eyes:      Extraocular Movements: Extraocular movements intact.      Pupils: Pupils are equal, round, and reactive to light.   Cardiovascular:      Rate and Rhythm: Normal rate and regular rhythm.      Pulses: Normal pulses.   Pulmonary:      Effort: Pulmonary effort is normal.      Breath sounds: Normal breath sounds.   Abdominal:      General: Abdomen is flat. Bowel sounds are normal.      Palpations: Abdomen is soft.   Musculoskeletal:         General: Normal range of motion.   Skin:     General: Skin is warm and dry.      Capillary Refill: Capillary refill takes less than 2 seconds.   Neurological:      General: No focal deficit present.      Mental Status: He is oriented to person, place, and time.   Psychiatric:         Mood and Affect: Mood normal.     Relevant Results  Results for  orders placed or performed during the hospital encounter of 05/08/24 (from the past 24 hour(s))   CBC and Auto Differential   Result Value Ref Range    WBC 6.2 4.4 - 11.3 x10*3/uL    nRBC 0.0 0.0 - 0.0 /100 WBCs    RBC 4.74 4.50 - 5.90 x10*6/uL    Hemoglobin 14.2 13.5 - 17.5 g/dL    Hematocrit 44.0 41.0 - 52.0 %    MCV 93 80 - 100 fL    MCH 30.0 26.0 - 34.0 pg    MCHC 32.3 32.0 - 36.0 g/dL    RDW 13.3 11.5 - 14.5 %    Platelets 139 (L) 150 - 450 x10*3/uL    Neutrophils % 62.3 40.0 - 80.0 %    Immature Granulocytes %, Automated 0.3 0.0 - 0.9 %    Lymphocytes % 21.6 13.0 - 44.0 %    Monocytes % 10.5 2.0 - 10.0 %    Eosinophils % 5.0 0.0 - 6.0 %    Basophils % 0.3 0.0 - 2.0 %    Neutrophils Absolute 3.84 1.60 - 5.50 x10*3/uL    Immature Granulocytes Absolute, Automated 0.02 0.00 - 0.50 x10*3/uL    Lymphocytes Absolute 1.33 0.80 - 3.00 x10*3/uL    Monocytes Absolute 0.65 0.05 - 0.80 x10*3/uL    Eosinophils Absolute 0.31 0.00 - 0.40 x10*3/uL    Basophils Absolute 0.02 0.00 - 0.10 x10*3/uL   Basic Metabolic Panel   Result Value Ref Range    Glucose 114 (H) 65 - 99 mg/dL    Sodium 135 133 - 145 mmol/L    Potassium 4.5 3.4 - 5.1 mmol/L    Chloride 97 97 - 107 mmol/L    Bicarbonate 26 24 - 31 mmol/L    Urea Nitrogen 21 8 - 25 mg/dL    Creatinine 1.60 0.40 - 1.60 mg/dL    eGFR 45 (L) >60 mL/min/1.73m*2    Calcium 9.6 8.5 - 10.4 mg/dL    Anion Gap 12 <=19 mmol/L   Serial Troponin, Initial ([facility])   Result Value Ref Range    Troponin T, High Sensitivity 25 (HH) <=14 ng/L   Lipase   Result Value Ref Range    Lipase 19 16 - 63 U/L   Hepatic function panel   Result Value Ref Range    AST 16 5 - 40 U/L    ALT 20 5 - 40 U/L    Alkaline Phosphatase 87 35 - 125 U/L    Bilirubin, Total 0.3 0.1 - 1.2 mg/dL    Bilirubin, Direct <0.2 0.0 - 0.2 mg/dL    Total Protein 6.8 5.9 - 7.9 g/dL    Albumin 4.0 3.5 - 5.0 g/dL   ECG 12 Lead   Result Value Ref Range    Ventricular Rate 67 BPM    Atrial Rate 67 BPM    AZ Interval 196 ms    QRS Duration  116 ms    QT Interval 404 ms    QTC Calculation(Bazett) 426 ms    P Axis 13 degrees    R Axis -47 degrees    T Axis 14 degrees    QRS Count 11 beats    Q Onset 212 ms    P Onset 114 ms    P Offset 173 ms    T Offset 414 ms    QTC Fredericia 419 ms   Serial Troponin, 2 Hour ([facility])   Result Value Ref Range    Troponin T, High Sensitivity 26 (HH) <=14 ng/L   POCT GLUCOSE   Result Value Ref Range    POCT Glucose 117 (H) 74 - 99 mg/dL   Serial Troponin, 6 Hour ([facility])   Result Value Ref Range    Troponin T, High Sensitivity 29 (HH) <=14 ng/L   POCT GLUCOSE   Result Value Ref Range    POCT Glucose 152 (H) 74 - 99 mg/dL   Transthoracic Echo (TTE) Complete   Result Value Ref Range    BSA 2.3 m2   POCT GLUCOSE   Result Value Ref Range    POCT Glucose 121 (H) 74 - 99 mg/dL     Assessment/Plan   Epigastric pain/chest pain  -Possibly secondary to esophageal mass  -CT shows possible esophageal and pancreatic masses   -GI saw. Pt to have MRCP today  -Plan for EGD tomorrow. NPO after MN  -NPO pending MRCP. Will attempt to feed after.  -Cardiology saw, doubts acute coronary syndrome  -Ca 19-9 pending    DM  -Holding oral meds for now  -Insulin SS   -Monitor blood glucose    Schizophrenia  -Continue home meds    CKD stage III  -Creatinine at baseline, monitor     MRDD  -Lives in group home    Atrial Fibrillation  -Continue BB and Eliquis    DVT Prophylaxis  -Eliquis    Plan  Plan of care d/w Guardian/Brother, Avelino Underwood, and he is agreeable. Gave verbal consent for EGD tomorrow. GI notified.      Amara Gaming, YUAN-CNP

## 2024-05-09 NOTE — PROGRESS NOTES
05/09/24 1737   Discharge Planning   Living Arrangements Other (Comment)  (lives in a group home)   Support Systems Family members;Other (Comment)  (caregivers at the group home)   Assistance Needed Assistance with ADLs, staff helps with meals   Type of Residence Group home  (Name of group home is [facility] [address] in [place].)   Do you have animals or pets at home? No   Care Facility Name [facility]   Who is requesting discharge planning? Provider   Home or Post Acute Services Other (Comment)  (has caregivers at the group home)   Patient expects to be discharged to: Group Home with Care Givers   Does the patient need discharge transport arranged? No   Financial Resource Strain   How hard is it for you to pay for the very basics like food, housing, medical care, and heating? Not hard   Housing Stability   In the last 12 months, was there a time when you were not able to pay the mortgage or rent on time? N   In the last 12 months, was there a time when you did not have a steady place to sleep or slept in a shelter (including now)? N   Transportation Needs   In the past 12 months, has lack of transportation kept you from medical appointments or from getting medications? no   In the past 12 months, has lack of transportation kept you from meetings, work, or from getting things needed for daily living? No     Nikhil Underwood is a 74 y.o. male presenting with Chest pain.  Patient does have history of MRDD, schizophrenia.  He lives in group home.  Patient is very minimal historian, he cannot contribute anything to the history. According to the caregiver, patient was complaining of some epigastric pain and then some chest pain so he was brought to the hospital.  According to caregiver patient was also short of breath. Caregiver was at patient's bedside.   Patient is alert, he is not agitated.  He is fixated on food and having a blue hat. Repeats himself.   He cannot answer any questions.  His  speech is very bizarre and not related to current situation. Wants to shake hands and give hugs. Per caregiver, patient requires assistance with ADLs and does some himself with direction. Is able to ambulate. Requires 24/7 care/supervision. Staff makes his meals and able to feed self. Patient will return back to group home. .  Patient has no DNR or living will. Avelino - Brother [phone].    PLAN: PER NOTES:   -CT done and  shows possible esophageal and pancreatic masses   -GI saw. Pt to have MRCP today  -Plan for EGD tomorrow. NPO after Midnight  -NPO pending MRCP. Will attempt to feed after.  -Cardiology saw, doubts acute coronary syndrome  -Ca 19-9 pending

## 2024-05-09 NOTE — NURSING NOTE
Spoke with RN from pt's group home she stated pt gets his meds from [facility] but if he needs any antibiotics at discharge it is ok to fill at [facility] pt pharmacy.

## 2024-05-09 NOTE — CARE PLAN
Problem: Pain - Adult  Goal: Verbalizes/displays adequate comfort level or baseline comfort level  Outcome: Progressing     Problem: Safety - Adult  Goal: Free from fall injury  Outcome: Progressing     Problem: Discharge Planning  Goal: Discharge to home or other facility with appropriate resources  Outcome: Progressing     Problem: Chronic Conditions and Co-morbidities  Goal: Patient's chronic conditions and co-morbidity symptoms are monitored and maintained or improved  Outcome: Progressing   The patient's goals for the shift include Rest; remain free from pain/injury/falls    The clinical goals for the shift include Maintain pt safety/comfort; monitor labs/vitals; pain management

## 2024-05-09 NOTE — H&P
History Of Present Illness  Nikhil Underwood is a 74 y.o. male presenting with Chest pain.  Patient does have history of MRDD, schizophrenia.  He lives in group home.  Patient is very minimal historian, he cannot contribute anything to the history.  I discussed the situation with his caregiver.  According to the caregiver, patient was complaining of some epigastric pain and then some chest pain so he was brought to the hospital.  During exam patient denied any chest pain.  According to caregiver patient was also short of breath.  Past Medical History  I reviewed old medical records.  Patient receives all his care through [facility].  Acute diastolic CHF (congestive heart failure) ([facility]) 08/31/2020  CKD (chronic kidney disease) stage 3, GFR 30-59 ml/min ([facility])  Dental decay 02/25/2020  Added automatically from request for surgery [id number]  Elevated PSA, less than 10 ng/ml 06/09/2020  Essential hypertension  LABILE  Gout  Hemorrhoids  Hydrocele  Hyperlipidemia  Intellectual disability  Obesity, Class I, BMI 30-34.9 10/08/2019  PAF (paroxysmal atrial fibrillation) ([facility]) 08/06/2020    Parkinson's disease  Pericardial effusion 09/02/2020  Pilonidal cyst  Pleural effusion 08/31/2020  Pleural effusion  Schizophrenia, schizo-affective type ([facility])  History of pericardial effusion, patient's cardiologist Dinah Gandhi MD    TTE 7/4/21  CONCLUSIONS:   - Technically difficult exam due to body habitus and suboptimal positioning.   - Exam indication: Pericardial conditions   - The left ventricle is normal in size. There is mild concentric left ventricular   hypertrophy. Left ventricular systolic function is normal. EF = 60 ± 5% (visual   est.)   - The right ventricle is normal in size. Right ventricular systolic function is   normal.   - There is no pericardial effusion   - Exam was compared with the prior  echocardiographic exam performed on   2/21/2021 There is no significant change.      Surgical  History  Procedure Laterality Date  EXCISION PILONIDAL CYST/SINUS SIMPLE 01/01/2002  HEMORROIDECTOMY, INTERNAL/EXTERNAL:SIMPLE 10/01/1992  WITH PILONIDAL CYST EXCISION  PAST SURGICAL HISTORY OF 01/01/1998  EXC LESION LEFT EAR  REMOVAL PLEUREX CATHETER Left  REPAIR OF HYDROCELE 01/01/1988     Social History    has no history on file for tobacco use, alcohol use, and drug use.  Lives in group home  Family History  No family history on file.  Patient cannot provide  Allergies  Patient has no known allergies.    Review of Systems  Patient cannot provide.  Physical Exam  Location: Emergency department, room 6.  Date of exam: May 8, 2024.  Pi is in no apparent respiratory distress.   Cooperative with exam.  Nontoxic-appearing.  Comfortable at rest on room air.  Skin is clean, dry.  No skin lesions, rashes, ecchymosis.  Obese  Head is atraumatic, normocephalic.  Mouth mucosa is pink and moist.  No mucosal lesions.  No nasal discharge.  Musculoskeletal: No deformities, no muscle swelling.  No point tenderness to palpation.  Neck is supple, no JVD, no carotid bruits.  No lymphadenopathy.  Chest is atraumatic.  No tenderness to palpation.  Lungs are clear to auscultation bilaterally.  No wheezes, no rales, no rhonchi.  Heart: Regular rate and rhythm S1-S2.  No murmurs, rubs, gallops.  Peripheral pulses are palpable in all extremities, equal.  Abdomen is soft, not tender, not distended.  Bowel sounds positive in all quadrants.  No rebound, no guarding.  Full exam deferred.  Extremities: Trace peripheral edema, cords, cyanosis, varices.  Neuro: Patient is alert, oriented to name only moves all extremities.  No gross focal neurological deficits.  Face is symmetrical.  Tongue is midline.  No visual abnormalities.  No dysarthria or aphasia.  Psychiatric: Patient is alert, he is not agitated.  He is fixated on food.  He cannot answer any of my questions.  His speech is very bizarre and Cardec, not related to current  situation.  Last Recorded Vitals  /68   Pulse 70   Resp 18   Wt 104 kg (230 lb)   SpO2 98%     Relevant Results        Results for orders placed or performed during the hospital encounter of 05/08/24 (from the past 24 hour(s))   CBC and Auto Differential   Result Value Ref Range    WBC 6.2 4.4 - 11.3 x10*3/uL    nRBC 0.0 0.0 - 0.0 /100 WBCs    RBC 4.74 4.50 - 5.90 x10*6/uL    Hemoglobin 14.2 13.5 - 17.5 g/dL    Hematocrit 44.0 41.0 - 52.0 %    MCV 93 80 - 100 fL    MCH 30.0 26.0 - 34.0 pg    MCHC 32.3 32.0 - 36.0 g/dL    RDW 13.3 11.5 - 14.5 %    Platelets 139 (L) 150 - 450 x10*3/uL    Neutrophils % 62.3 40.0 - 80.0 %    Immature Granulocytes %, Automated 0.3 0.0 - 0.9 %    Lymphocytes % 21.6 13.0 - 44.0 %    Monocytes % 10.5 2.0 - 10.0 %    Eosinophils % 5.0 0.0 - 6.0 %    Basophils % 0.3 0.0 - 2.0 %    Neutrophils Absolute 3.84 1.60 - 5.50 x10*3/uL    Immature Granulocytes Absolute, Automated 0.02 0.00 - 0.50 x10*3/uL    Lymphocytes Absolute 1.33 0.80 - 3.00 x10*3/uL    Monocytes Absolute 0.65 0.05 - 0.80 x10*3/uL    Eosinophils Absolute 0.31 0.00 - 0.40 x10*3/uL    Basophils Absolute 0.02 0.00 - 0.10 x10*3/uL   Basic Metabolic Panel   Result Value Ref Range    Glucose 114 (H) 65 - 99 mg/dL    Sodium 135 133 - 145 mmol/L    Potassium 4.5 3.4 - 5.1 mmol/L    Chloride 97 97 - 107 mmol/L    Bicarbonate 26 24 - 31 mmol/L    Urea Nitrogen 21 8 - 25 mg/dL    Creatinine 1.60 0.40 - 1.60 mg/dL    eGFR 45 (L) >60 mL/min/1.73m*2    Calcium 9.6 8.5 - 10.4 mg/dL    Anion Gap 12 <=19 mmol/L   Serial Troponin, Initial ([facility])   Result Value Ref Range    Troponin T, High Sensitivity 25 (HH) <=14 ng/L   Lipase   Result Value Ref Range    Lipase 19 16 - 63 U/L   Hepatic function panel   Result Value Ref Range    AST 16 5 - 40 U/L    ALT 20 5 - 40 U/L    Alkaline Phosphatase 87 35 - 125 U/L    Bilirubin, Total 0.3 0.1 - 1.2 mg/dL    Bilirubin, Direct <0.2 0.0 - 0.2 mg/dL    Total Protein 6.8 5.9 - 7.9 g/dL    Albumin 4.0  3.5 - 5.0 g/dL   Serial Troponin, 2 Hour ([facility])   Result Value Ref Range    Troponin T, High Sensitivity 26 (HH) <=14 ng/L   POCT GLUCOSE   Result Value Ref Range    POCT Glucose 117 (H) 74 - 99 mg/dL   Serial Troponin, 6 Hour ([facility])   Result Value Ref Range    Troponin T, High Sensitivity 29 (HH) <=14 ng/L    CT abdomen pelvis w IV contrast    Addendum Date: 5/8/2024    Interpreted By:  Conrado Haile, ADDENDUM: As described in the radiology report, there is a 2.8 cm x 1.3 cm density adjacent to the distal esophagus which is stable in size comparison to the 4/7/2023 CT examination. On review of the 7/24/2020 CT chest examination, the patient previously had a large pericardial effusion and there was hyperdense fluid in this region on the remote prior CT chest examination, and this finding may correspond to mild residual loculated complex fluid. Attention on progress imaging is however recommended to exclude other underlying pathology including enlarged lymph node.     Signed by: Conrado Haile 5/8/2024 11:26 PM   -------- ORIGINAL REPORT -------- Dictation workstation:   [id number]    Result Date: 5/8/2024  Interpreted By:  Conrado Haile, STUDY: CT ABDOMEN PELVIS W IV CONTRAST;  5/8/2024 8:52 pm   INDICATION: Signs/Symptoms:diffuse abd pain.   COMPARISON: 4/7/2023   ACCESSION NUMBER(S): [id number]   ORDERING CLINICIAN: SHARON HOLT   TECHNIQUE: Contiguous axial images of the abdomen and pelvis were obtained after the intravenous administration of  contrast. Coronal and sagittal reformatted images were obtained from the axial images.   FINDINGS: There is limited evaluation of the lung bases. Bibasilar subsegmental atelectasis. No pleural effusion.   Stable 2.8 cm x 1.2 cm opacity along the right aspect of the distal esophagus.   There is hepatic steatosis. The gallbladder is present. No dilatation common bile duct.   There is edema and expansion of the distal pancreatic body and tail with hypoenhancement. There is  mild edema adjacent to the pancreatic tail. 10 mm soft tissue nodular density to the pancreatic tail and spleen.   2.2 cm cystic lesion in the spleen.   The adrenal glands appear unremarkable.   There is atrophy and cortical scarring of the kidneys. There are several renal cysts and subcentimeter hypodensities too small to characterize. 7 mm nonobstructive left renal calculus. No hydronephrosis.   No evidence of bowel obstruction or acute appendicitis.   Urinary bladder appears unremarkable.   Prostatomegaly.   Multilevel bridging anterior osseous spurring of the lumbar spine.       Expansion of the distal pancreatic body and tail with hypoenhancement and mild surrounding edema, findings may relate to pancreatitis with necrotizing pancreatitis not excluded. Follow-up MRI is however recommended for further evaluation and to exclude other pathology including mass. 10 mm soft tissue nodular density between the pancreatic tail and spleen may correspond to a lymph node.   Bilateral renal atrophy and cortical scarring and small renal cysts and subcentimeter hypodensities too small to characterize. 7 mm nonobstructive left renal calculus.   Prostatomegaly; please correlate with PSA.   MACRO: Critical Finding:  See findings. Notification was initiated on 5/8/2024 at 9:33 pm by  Conrado Haile.  (**-YCF-**) Instructions:   Signed by: Conrado Haile 5/8/2024 9:33 PM Dictation workstation:   [id number]    XR chest 2 views    Result Date: 5/8/2024  Interpreted By:  Conrado Haile, STUDY: XR CHEST 2 VIEWS;  5/8/2024 6:55 pm   INDICATION: Signs/Symptoms:chest pain.   COMPARISON: 9/22/2020   ACCESSION NUMBER(S): [id number]   ORDERING CLINICIAN: SHARON HOLT   FINDINGS: The cardiac silhouette is stable in size. There is mild basilar subsegmental atelectasis. No significant pleural effusion. No pneumothorax. Diffuse multilevel bridging anterior osseous spurring of the thoracic spine.       Mild basilar subsegmental atelectasis.    MACRO: None   Signed by: Conrado Godoycher 5/8/2024 7:13 PM Dictation workstation:   [id number]       Assessment/Plan   Principal Problem:    Abdominal pain  Active Problems:    Acute pancreatitis, unspecified complication status, unspecified pancreatitis type ([facility])      Patient has chest pain.  Denies any cardiac symptoms.  EKG, reviewed: Sinus rhythm with without acute ischemic changes.  Cardiac enzymes are mildly elevated, flat.  Patient has history of pericardial effusion in the past and paroxysmal atrial fibrillation.  Will consult cardiology.  Check echo  Abdominal pain initial complaint.  CAT scan of the abdomen demonstrated acute pancreatitis.  Will consult gastroenterology.  Type 2 diabetes, patient takes metformin.  Will use insulin.  DVT prophylaxis.  Lovenox  Schizophrenia, anxiety disorder continue medications       Lorraine Alexander MD

## 2024-05-09 NOTE — CONSULTS
Consults    Reason For Consult  Abnormal CT    History Of Present Illness  Nikhil Underwood is a 74 y.o. male presenting with chest pain vs epigastric pain. Patient hx MRDD, he does have care giver per records review but not present at time of assessment. Patient reports abdominal pain improved. He does mentions chronic dysphagia. Denies NV. CT a/p showing canelo esophageal mass as well as abnormal pancreas changes ?mass vs necrotizing pancreatitis. LFTs and lipase are normal. HH normal. No record of recent EGD      Past Medical History  He has no past medical history on file.    Surgical History  He has no past surgical history on file.     Social History  He reports that he has never smoked. He has never used smokeless tobacco. He reports that he does not drink alcohol and does not use drugs.    Family History  No family history on file.     Allergies  Patient has no known allergies.    Review of Systems   Reason unable to perform ROS: Limited due to mentation baseline.   Constitutional:  Negative for chills and fever.   Gastrointestinal:  Positive for abdominal pain. Negative for constipation, diarrhea, nausea and vomiting.        Physical Exam  Vitals reviewed.   Constitutional:       General: He is awake.      Appearance: Normal appearance. He is obese.   HENT:      Head: Normocephalic and atraumatic.      Mouth/Throat:      Mouth: Mucous membranes are moist.   Cardiovascular:      Rate and Rhythm: Normal rate and regular rhythm.   Pulmonary:      Effort: Pulmonary effort is normal.      Breath sounds: Normal breath sounds.   Abdominal:      General: There is distension.      Palpations: Abdomen is soft.      Tenderness: There is abdominal tenderness. There is no guarding.   Musculoskeletal:      Cervical back: Normal range of motion and neck supple.   Skin:     General: Skin is warm and dry.   Neurological:      General: No focal deficit present.      Mental Status: He is alert. Mental status is at baseline.    Psychiatric:         Attention and Perception: Attention and perception normal.         Mood and Affect: Mood normal.         Behavior: Behavior normal.          Last Recorded Vitals  Blood pressure 150/80, pulse 67, temperature 36.4 °C (97.5 °F), temperature source Oral, resp. rate 19, height 1.829 m (6'), weight 104 kg (230 lb), SpO2 95%.    Relevant Results  Results for orders placed or performed during the hospital encounter of 05/08/24 (from the past 24 hour(s))   CBC and Auto Differential   Result Value Ref Range    WBC 6.2 4.4 - 11.3 x10*3/uL    nRBC 0.0 0.0 - 0.0 /100 WBCs    RBC 4.74 4.50 - 5.90 x10*6/uL    Hemoglobin 14.2 13.5 - 17.5 g/dL    Hematocrit 44.0 41.0 - 52.0 %    MCV 93 80 - 100 fL    MCH 30.0 26.0 - 34.0 pg    MCHC 32.3 32.0 - 36.0 g/dL    RDW 13.3 11.5 - 14.5 %    Platelets 139 (L) 150 - 450 x10*3/uL    Neutrophils % 62.3 40.0 - 80.0 %    Immature Granulocytes %, Automated 0.3 0.0 - 0.9 %    Lymphocytes % 21.6 13.0 - 44.0 %    Monocytes % 10.5 2.0 - 10.0 %    Eosinophils % 5.0 0.0 - 6.0 %    Basophils % 0.3 0.0 - 2.0 %    Neutrophils Absolute 3.84 1.60 - 5.50 x10*3/uL    Immature Granulocytes Absolute, Automated 0.02 0.00 - 0.50 x10*3/uL    Lymphocytes Absolute 1.33 0.80 - 3.00 x10*3/uL    Monocytes Absolute 0.65 0.05 - 0.80 x10*3/uL    Eosinophils Absolute 0.31 0.00 - 0.40 x10*3/uL    Basophils Absolute 0.02 0.00 - 0.10 x10*3/uL   Basic Metabolic Panel   Result Value Ref Range    Glucose 114 (H) 65 - 99 mg/dL    Sodium 135 133 - 145 mmol/L    Potassium 4.5 3.4 - 5.1 mmol/L    Chloride 97 97 - 107 mmol/L    Bicarbonate 26 24 - 31 mmol/L    Urea Nitrogen 21 8 - 25 mg/dL    Creatinine 1.60 0.40 - 1.60 mg/dL    eGFR 45 (L) >60 mL/min/1.73m*2    Calcium 9.6 8.5 - 10.4 mg/dL    Anion Gap 12 <=19 mmol/L   Serial Troponin, Initial ([facility])   Result Value Ref Range    Troponin T, High Sensitivity 25 (HH) <=14 ng/L   Lipase   Result Value Ref Range    Lipase 19 16 - 63 U/L   Hepatic function panel    Result Value Ref Range    AST 16 5 - 40 U/L    ALT 20 5 - 40 U/L    Alkaline Phosphatase 87 35 - 125 U/L    Bilirubin, Total 0.3 0.1 - 1.2 mg/dL    Bilirubin, Direct <0.2 0.0 - 0.2 mg/dL    Total Protein 6.8 5.9 - 7.9 g/dL    Albumin 4.0 3.5 - 5.0 g/dL   ECG 12 Lead   Result Value Ref Range    Ventricular Rate 67 BPM    Atrial Rate 67 BPM    FL Interval 196 ms    QRS Duration 116 ms    QT Interval 404 ms    QTC Calculation(Bazett) 426 ms    P Axis 13 degrees    R Axis -47 degrees    T Axis 14 degrees    QRS Count 11 beats    Q Onset 212 ms    P Onset 114 ms    P Offset 173 ms    T Offset 414 ms    QTC Fredericia 419 ms   Serial Troponin, 2 Hour ([facility])   Result Value Ref Range    Troponin T, High Sensitivity 26 (HH) <=14 ng/L   POCT GLUCOSE   Result Value Ref Range    POCT Glucose 117 (H) 74 - 99 mg/dL   Serial Troponin, 6 Hour ([facility])   Result Value Ref Range    Troponin T, High Sensitivity 29 (HH) <=14 ng/L   POCT GLUCOSE   Result Value Ref Range    POCT Glucose 152 (H) 74 - 99 mg/dL     ECG 12 Lead    Result Date: 5/9/2024  Normal sinus rhythm Possible Left atrial enlargement Left axis deviation Inferior infarct (cited on or before 28-AUG-2020) Abnormal ECG When compared with ECG of 28-AUG-2020 12:42, Inferior and lateral ST abnormality no longer evident Confirmed by Cody Corado ([id number]) on 5/9/2024 7:48:59 AM    CT abdomen pelvis w IV contrast    Addendum Date: 5/8/2024    Interpreted By:  Conrado Haile, ADDENDUM: As described in the radiology report, there is a 2.8 cm x 1.3 cm density adjacent to the distal esophagus which is stable in size comparison to the 4/7/2023 CT examination. On review of the 7/24/2020 CT chest examination, the patient previously had a large pericardial effusion and there was hyperdense fluid in this region on the remote prior CT chest examination, and this finding may correspond to mild residual loculated complex fluid. Attention on progress imaging is however recommended to exclude  other underlying pathology including enlarged lymph node.     Signed by: Conrado Haile 5/8/2024 11:26 PM   -------- ORIGINAL REPORT -------- Dictation workstation:   [id number]    Result Date: 5/8/2024  Interpreted By:  Conrado Haile, STUDY: CT ABDOMEN PELVIS W IV CONTRAST;  5/8/2024 8:52 pm   INDICATION: Signs/Symptoms:diffuse abd pain.   COMPARISON: 4/7/2023   ACCESSION NUMBER(S): [id number]   ORDERING CLINICIAN: SHARON HOLT   TECHNIQUE: Contiguous axial images of the abdomen and pelvis were obtained after the intravenous administration of  contrast. Coronal and sagittal reformatted images were obtained from the axial images.   FINDINGS: There is limited evaluation of the lung bases. Bibasilar subsegmental atelectasis. No pleural effusion.   Stable 2.8 cm x 1.2 cm opacity along the right aspect of the distal esophagus.   There is hepatic steatosis. The gallbladder is present. No dilatation common bile duct.   There is edema and expansion of the distal pancreatic body and tail with hypoenhancement. There is mild edema adjacent to the pancreatic tail. 10 mm soft tissue nodular density to the pancreatic tail and spleen.   2.2 cm cystic lesion in the spleen.   The adrenal glands appear unremarkable.   There is atrophy and cortical scarring of the kidneys. There are several renal cysts and subcentimeter hypodensities too small to characterize. 7 mm nonobstructive left renal calculus. No hydronephrosis.   No evidence of bowel obstruction or acute appendicitis.   Urinary bladder appears unremarkable.   Prostatomegaly.   Multilevel bridging anterior osseous spurring of the lumbar spine.       Expansion of the distal pancreatic body and tail with hypoenhancement and mild surrounding edema, findings may relate to pancreatitis with necrotizing pancreatitis not excluded. Follow-up MRI is however recommended for further evaluation and to exclude other pathology including mass. 10 mm soft tissue nodular density between the  pancreatic tail and spleen may correspond to a lymph node.   Bilateral renal atrophy and cortical scarring and small renal cysts and subcentimeter hypodensities too small to characterize. 7 mm nonobstructive left renal calculus.   Prostatomegaly; please correlate with PSA.   MACRO: Critical Finding:  See findings. Notification was initiated on 5/8/2024 at 9:33 pm by  Conrado Haile.  (**-YCF-**) Instructions:   Signed by: Conrado Haile 5/8/2024 9:33 PM Dictation workstation:   [id number]    XR chest 2 views    Result Date: 5/8/2024  Interpreted By:  Conrado Haile, STUDY: XR CHEST 2 VIEWS;  5/8/2024 6:55 pm   INDICATION: Signs/Symptoms:chest pain.   COMPARISON: 9/22/2020   ACCESSION NUMBER(S): [id number]   ORDERING CLINICIAN: SHARON HOLT   FINDINGS: The cardiac silhouette is stable in size. There is mild basilar subsegmental atelectasis. No significant pleural effusion. No pneumothorax. Diffuse multilevel bridging anterior osseous spurring of the thoracic spine.       Mild basilar subsegmental atelectasis.   MACRO: None   Signed by: Conrado Haile 5/8/2024 7:13 PM Dictation workstation:   [id number]        Assessment/Plan     Epigastric Pain, Abnormal CT, Dysphagia    -There are two different areas on concern on imaging   1. Esophagus ? Mass with chronic dysphagia. Based on care giver wishes, recommend EGD. Possible tomorrow    2. Pancreas ?mass vs necrotizing panc. His LFTs and lipase are normal. Will order MR pancreas and Ca 19-9 today   3. Enlarged prostate, add PSA     Reviewed with Dr Reyna     I spent 30 minutes in the professional and overall care of this patient.

## 2024-05-10 ENCOUNTER — ANESTHESIA EVENT (OUTPATIENT)
Dept: GASTROENTEROLOGY | Facility: HOSPITAL | Age: 74
DRG: 439 | End: 2024-05-10
Payer: MEDICARE

## 2024-05-10 ENCOUNTER — ANESTHESIA (OUTPATIENT)
Dept: GASTROENTEROLOGY | Facility: HOSPITAL | Age: 74
DRG: 439 | End: 2024-05-10
Payer: MEDICARE

## 2024-05-10 ENCOUNTER — PHARMACY VISIT (OUTPATIENT)
Dept: PHARMACY | Facility: CLINIC | Age: 74
End: 2024-05-10
Payer: MEDICARE

## 2024-05-10 ENCOUNTER — APPOINTMENT (OUTPATIENT)
Dept: GASTROENTEROLOGY | Facility: HOSPITAL | Age: 74
DRG: 439 | End: 2024-05-10
Payer: MEDICARE

## 2024-05-10 VITALS
RESPIRATION RATE: 16 BRPM | HEART RATE: 67 BPM | TEMPERATURE: 98.4 F | WEIGHT: 230 LBS | OXYGEN SATURATION: 97 % | SYSTOLIC BLOOD PRESSURE: 136 MMHG | HEIGHT: 72 IN | DIASTOLIC BLOOD PRESSURE: 71 MMHG | BODY MASS INDEX: 31.15 KG/M2

## 2024-05-10 LAB
ALBUMIN SERPL-MCNC: 3.6 G/DL (ref 3.5–5)
ALP BLD-CCNC: 80 U/L (ref 35–125)
ALT SERPL-CCNC: <5 U/L (ref 5–40)
ANION GAP SERPL CALC-SCNC: 10 MMOL/L
AST SERPL-CCNC: 14 U/L (ref 5–40)
BILIRUB SERPL-MCNC: 0.4 MG/DL (ref 0.1–1.2)
BUN SERPL-MCNC: 22 MG/DL (ref 8–25)
CALCIUM SERPL-MCNC: 9 MG/DL (ref 8.5–10.4)
CANCER AG19-9 SERPL-ACNC: 17.47 U/ML
CHLORIDE SERPL-SCNC: 104 MMOL/L (ref 97–107)
CO2 SERPL-SCNC: 25 MMOL/L (ref 24–31)
CREAT SERPL-MCNC: 1.6 MG/DL (ref 0.4–1.6)
EGFRCR SERPLBLD CKD-EPI 2021: 45 ML/MIN/1.73M*2
ERYTHROCYTE [DISTWIDTH] IN BLOOD BY AUTOMATED COUNT: 13.9 % (ref 11.5–14.5)
GLUCOSE BLD MANUAL STRIP-MCNC: 106 MG/DL (ref 74–99)
GLUCOSE BLD MANUAL STRIP-MCNC: 127 MG/DL (ref 74–99)
GLUCOSE SERPL-MCNC: 130 MG/DL (ref 65–99)
HCT VFR BLD AUTO: 43.7 % (ref 41–52)
HGB BLD-MCNC: 13.5 G/DL (ref 13.5–17.5)
MCH RBC QN AUTO: 29.7 PG (ref 26–34)
MCHC RBC AUTO-ENTMCNC: 30.9 G/DL (ref 32–36)
MCV RBC AUTO: 96 FL (ref 80–100)
NRBC BLD-RTO: 0 /100 WBCS (ref 0–0)
PLATELET # BLD AUTO: 113 X10*3/UL (ref 150–450)
POTASSIUM SERPL-SCNC: 4.6 MMOL/L (ref 3.4–5.1)
PROT SERPL-MCNC: 6.2 G/DL (ref 5.9–7.9)
RBC # BLD AUTO: 4.55 X10*6/UL (ref 4.5–5.9)
SODIUM SERPL-SCNC: 139 MMOL/L (ref 133–145)
WBC # BLD AUTO: 4.3 X10*3/UL (ref 4.4–11.3)

## 2024-05-10 PROCEDURE — RXMED WILLOW AMBULATORY MEDICATION CHARGE

## 2024-05-10 PROCEDURE — 82947 ASSAY GLUCOSE BLOOD QUANT: CPT

## 2024-05-10 PROCEDURE — 3700000002 HC GENERAL ANESTHESIA TIME - EACH INCREMENTAL 1 MINUTE

## 2024-05-10 PROCEDURE — 88305 TISSUE EXAM BY PATHOLOGIST: CPT | Mod: TC | Performed by: INTERNAL MEDICINE

## 2024-05-10 PROCEDURE — 2500000004 HC RX 250 GENERAL PHARMACY W/ HCPCS (ALT 636 FOR OP/ED): Performed by: INTERNAL MEDICINE

## 2024-05-10 PROCEDURE — 2500000001 HC RX 250 WO HCPCS SELF ADMINISTERED DRUGS (ALT 637 FOR MEDICARE OP): Performed by: INTERNAL MEDICINE

## 2024-05-10 PROCEDURE — 2500000006 HC RX 250 W HCPCS SELF ADMINISTERED DRUGS (ALT 637 FOR ALL PAYERS): Mod: MUE | Performed by: INTERNAL MEDICINE

## 2024-05-10 PROCEDURE — A43239 PR EDG TRANSORAL BIOPSY SINGLE/MULTIPLE: Performed by: ANESTHESIOLOGY

## 2024-05-10 PROCEDURE — A43239 PR EDG TRANSORAL BIOPSY SINGLE/MULTIPLE: Performed by: NURSE ANESTHETIST, CERTIFIED REGISTERED

## 2024-05-10 PROCEDURE — 36415 COLL VENOUS BLD VENIPUNCTURE: CPT | Performed by: NURSE PRACTITIONER

## 2024-05-10 PROCEDURE — 7100000002 HC RECOVERY ROOM TIME - EACH INCREMENTAL 1 MINUTE

## 2024-05-10 PROCEDURE — 88305 TISSUE EXAM BY PATHOLOGIST: CPT | Performed by: PATHOLOGY

## 2024-05-10 PROCEDURE — 85027 COMPLETE CBC AUTOMATED: CPT | Performed by: NURSE PRACTITIONER

## 2024-05-10 PROCEDURE — 43239 EGD BIOPSY SINGLE/MULTIPLE: CPT | Performed by: INTERNAL MEDICINE

## 2024-05-10 PROCEDURE — 0DB98ZZ EXCISION OF DUODENUM, VIA NATURAL OR ARTIFICIAL OPENING ENDOSCOPIC: ICD-10-PCS | Performed by: INTERNAL MEDICINE

## 2024-05-10 PROCEDURE — 7100000001 HC RECOVERY ROOM TIME - INITIAL BASE CHARGE

## 2024-05-10 PROCEDURE — 2500000001 HC RX 250 WO HCPCS SELF ADMINISTERED DRUGS (ALT 637 FOR MEDICARE OP)

## 2024-05-10 PROCEDURE — 99100 ANES PT EXTEME AGE<1 YR&>70: CPT | Performed by: ANESTHESIOLOGY

## 2024-05-10 PROCEDURE — 84075 ASSAY ALKALINE PHOSPHATASE: CPT | Performed by: NURSE PRACTITIONER

## 2024-05-10 PROCEDURE — 86301 IMMUNOASSAY TUMOR CA 19-9: CPT | Mod: TRILAB,WESLAB | Performed by: NURSE PRACTITIONER

## 2024-05-10 PROCEDURE — 3700000001 HC GENERAL ANESTHESIA TIME - INITIAL BASE CHARGE

## 2024-05-10 PROCEDURE — 2500000004 HC RX 250 GENERAL PHARMACY W/ HCPCS (ALT 636 FOR OP/ED): Performed by: NURSE ANESTHETIST, CERTIFIED REGISTERED

## 2024-05-10 PROCEDURE — C9113 INJ PANTOPRAZOLE SODIUM, VIA: HCPCS | Performed by: INTERNAL MEDICINE

## 2024-05-10 RX ORDER — SODIUM CHLORIDE 9 MG/ML
INJECTION, SOLUTION INTRAVENOUS CONTINUOUS PRN
Status: DISCONTINUED | OUTPATIENT
Start: 2024-05-10 | End: 2024-05-10

## 2024-05-10 RX ORDER — PROPOFOL 10 MG/ML
INJECTION, EMULSION INTRAVENOUS AS NEEDED
Status: DISCONTINUED | OUTPATIENT
Start: 2024-05-10 | End: 2024-05-10

## 2024-05-10 RX ORDER — FAMOTIDINE 20 MG/1
20 TABLET, FILM COATED ORAL 2 TIMES DAILY
Qty: 60 TABLET | Refills: 0 | Status: SHIPPED | OUTPATIENT
Start: 2024-05-10

## 2024-05-10 RX ORDER — FAMOTIDINE 20 MG/1
20 TABLET, FILM COATED ORAL 2 TIMES DAILY
Status: DISCONTINUED | OUTPATIENT
Start: 2024-05-10 | End: 2024-05-10 | Stop reason: HOSPADM

## 2024-05-10 RX ADMIN — SODIUM CHLORIDE: 9 INJECTION, SOLUTION INTRAVENOUS at 11:54

## 2024-05-10 RX ADMIN — VALPROIC ACID 500 MG: 250 SOLUTION ORAL at 13:02

## 2024-05-10 RX ADMIN — ACETAMINOPHEN 650 MG: 325 TABLET ORAL at 13:03

## 2024-05-10 RX ADMIN — PROPOFOL 100 MG: 10 INJECTION, EMULSION INTRAVENOUS at 11:58

## 2024-05-10 RX ADMIN — ALLOPURINOL 100 MG: 100 TABLET ORAL at 13:00

## 2024-05-10 RX ADMIN — FUROSEMIDE 20 MG: 20 TABLET ORAL at 13:01

## 2024-05-10 RX ADMIN — PSYLLIUM HUSK 1 PACKET: 3.4 POWDER ORAL at 13:02

## 2024-05-10 RX ADMIN — METOPROLOL TARTRATE 50 MG: 50 TABLET, FILM COATED ORAL at 13:01

## 2024-05-10 RX ADMIN — FAMOTIDINE 20 MG: 20 TABLET ORAL at 13:03

## 2024-05-10 RX ADMIN — CARBIDOPA AND LEVODOPA 1.5 TABLET: 25; 100 TABLET ORAL at 13:00

## 2024-05-10 RX ADMIN — PANTOPRAZOLE SODIUM 40 MG: 40 INJECTION, POWDER, FOR SOLUTION INTRAVENOUS at 09:48

## 2024-05-10 RX ADMIN — EZETIMIBE 10 MG: 10 TABLET ORAL at 13:01

## 2024-05-10 RX ADMIN — OLANZAPINE 5 MG: 5 TABLET, FILM COATED ORAL at 13:01

## 2024-05-10 RX ADMIN — CLONAZEPAM 0.5 MG: 0.5 TABLET ORAL at 13:00

## 2024-05-10 RX ADMIN — LOSARTAN POTASSIUM 50 MG: 50 TABLET, FILM COATED ORAL at 13:01

## 2024-05-10 ASSESSMENT — PAIN - FUNCTIONAL ASSESSMENT
PAIN_FUNCTIONAL_ASSESSMENT: 0-10
PAIN_FUNCTIONAL_ASSESSMENT: 0-10

## 2024-05-10 ASSESSMENT — COGNITIVE AND FUNCTIONAL STATUS - GENERAL
PERSONAL GROOMING: A LITTLE
TURNING FROM BACK TO SIDE WHILE IN FLAT BAD: A LITTLE
DRESSING REGULAR LOWER BODY CLOTHING: A LITTLE
DRESSING REGULAR UPPER BODY CLOTHING: A LITTLE
WALKING IN HOSPITAL ROOM: A LOT
CLIMB 3 TO 5 STEPS WITH RAILING: A LOT
TOILETING: A LITTLE
STANDING UP FROM CHAIR USING ARMS: A LITTLE
MOVING TO AND FROM BED TO CHAIR: A LITTLE
MOVING FROM LYING ON BACK TO SITTING ON SIDE OF FLAT BED WITH BEDRAILS: A LITTLE
HELP NEEDED FOR BATHING: A LITTLE

## 2024-05-10 ASSESSMENT — PAIN SCALES - GENERAL
PAINLEVEL_OUTOF10: 0 - NO PAIN
PAINLEVEL_OUTOF10: 3
PAINLEVEL_OUTOF10: 0 - NO PAIN

## 2024-05-10 ASSESSMENT — PAIN DESCRIPTION - LOCATION: LOCATION: HEAD

## 2024-05-10 NOTE — DISCHARGE SUMMARY
Discharge Diagnosis  Abdominal pain    Issues Requiring Follow-Up  Pancreatic mass    Discharge Meds     Your medication list        START taking these medications        Instructions Last Dose Given Next Dose Due   famotidine 20 mg tablet  Commonly known as: Pepcid      Take 1 tablet (20 mg) by mouth 2 times a day.              CONTINUE taking these medications        Instructions Last Dose Given Next Dose Due   acetaminophen 325 mg capsule  Commonly known as: Tylenol           allopurinol 100 mg tablet  Commonly known as: Zyloprim           apixaban 5 mg tablet  Commonly known as: Eliquis           carbidopa-levodopa  mg tablet  Commonly known as: Sinemet           clonazePAM 0.5 mg tablet  Commonly known as: KlonoPIN           ergocalciferol 200 mcg/mL (8,000 unit/mL) drops  Commonly known as: Vitamin D-2           ezetimibe 10 mg tablet  Commonly known as: Zetia           fluticasone 50 mcg/actuation nasal spray  Commonly known as: Flonase           furosemide 20 mg tablet  Commonly known as: Lasix           guaiFENesin 600 mg 12 hr tablet  Commonly known as: Mucinex           LACTOBACILLUS ACIDOPHILUS ORAL           losartan 50 mg tablet  Commonly known as: Cozaar           MetamuciL 3.4 gram/5.4 gram powder  Generic drug: psyllium husk           metFORMIN 500 mg tablet  Commonly known as: Glucophage           metoprolol tartrate 50 mg tablet  Commonly known as: Lopressor           OLANZapine zydis 5 mg disintegrating tablet  Commonly known as: ZyPREXA           OLANZapine 5 mg tablet  Commonly known as: ZyPREXA           potassium chloride CR 10 mEq ER tablet  Commonly known as: Klor-Con           pravastatin 40 mg tablet  Commonly known as: Pravachol           QUEtiapine 400 mg tablet  Commonly known as: SEROquel           tamsulosin 0.4 mg 24 hr capsule  Commonly known as: Flomax           valproate 250 mg/5 mL oral solution  Commonly known as: Depakene           valproate 250 mg/5 mL oral  solution  Commonly known as: Depakene           Vraylar 6 mg capsule  Generic drug: cariprazine                  STOP taking these medications      pantoprazole 40 mg EC tablet  Commonly known as: ProtoNix                  Where to Get Your Medications        These medications were sent to [facility]  [address], [place] [address]      Hours: 9 AM to 6 PM Mon-Fri, 9 AM to 1 PM Sat Phone: [phone]   famotidine 20 mg tablet         Test Results Pending At Discharge  Pending Labs       Order Current Status    Surgical Pathology Exam Collected (05/10/24 1200)    Cancer Antigen 19-9 In process            Hospital Course   Pt with MRDD admitted for epigastric/chest pain. CT showed possible esophageal and pancreatic masses. MRCP was done and shows 6 cm hypoenhancing region in the pancreatic body/tail which appears masslike with encasement and occlusion of the splenic vein. Mass vs chronic pancreatitis. Was evaluated by Gastroenterology and underwent and EGD which showed no gastric/esophageal masses. A hiatal hernia was found and pt was also found to have polyps. Biopsies were taken. Cardiology saw, doubts acute coronary syndrome. Ca 19-9 pending. Pt will need to follow up outpatient for EUS and biopsy of pancreatic mass. Medically stable for discharge back to Group Home. Guardian/Brother Avelino was updated and educated on above. Notified of need to follow up with GI.     Pertinent Physical Exam At Time of Discharge  Physical Exam  HENT:      Head: Normocephalic and atraumatic.      Nose: Nose normal.      Mouth/Throat:      Mouth: Mucous membranes are moist.      Pharynx: Oropharynx is clear.   Eyes:      Extraocular Movements: Extraocular movements intact.      Pupils: Pupils are equal, round, and reactive to light.   Cardiovascular:      Rate and Rhythm: Normal rate and regular rhythm.      Pulses: Normal pulses.   Pulmonary:      Effort: Pulmonary effort is normal.      Breath sounds:  Normal breath sounds.   Abdominal:      General: Abdomen is flat. Bowel sounds are normal.      Palpations: Abdomen is soft.   Musculoskeletal:         General: Normal range of motion.   Skin:     General: Skin is warm and dry.      Capillary Refill: Capillary refill takes less than 2 seconds.   Neurological:      General: No focal deficit present.      Mental Status: He is oriented to person, place, and time.   Psychiatric:         Mood and Affect: Mood normal.         Outpatient Follow-Up  No future appointments.      Amara Gaming, APRN-CNP

## 2024-05-10 NOTE — ANESTHESIA POSTPROCEDURE EVALUATION
Patient: Nikhil Underwood    Procedure Summary       Date: 05/10/24 Room / Location: [facility]    Anesthesia Start: 1154 Anesthesia Stop: 1215    Procedure: EGD Diagnosis: Chest pain, unspecified type    Scheduled Providers: Rina Reyna MD; Paco Baker DO Responsible Provider: Paco Baker DO    Anesthesia Type: MAC ASA Status: 3            Anesthesia Type: MAC    Vitals Value Taken Time   /88 05/10/24 1231   Temp 36.4 °C (97.5 °F) 05/10/24 1230   Pulse 69 05/10/24 1230   Resp 18 05/10/24 1230   SpO2 94 % 05/10/24 1230       Anesthesia Post Evaluation    Patient location during evaluation: bedside  Patient participation: complete - patient participated  Level of consciousness: awake  Pain management: adequate  Airway patency: patent  Cardiovascular status: acceptable  Respiratory status: acceptable  Hydration status: acceptable  Postoperative Nausea and Vomiting: none        There were no known notable events for this encounter.

## 2024-05-10 NOTE — PROGRESS NOTES
05/10/24 1124   Discharge Planning   Patient expects to be discharged to: Group  home with caregivers   Does the patient need discharge transport arranged? No     Patient is being discharged back to his group home today. Monique at his home requests report be called to her. Nurse notified. [facility]; medical team notified. Monique confirmed staff can transport the patient around 4:00 PM. Patient's brother, Avelino, updated to the above.

## 2024-05-10 NOTE — ANESTHESIA PREPROCEDURE EVALUATION
Patient: Nikhil Underwood    Procedure Information       Date/Time: 05/10/24 1455    Scheduled providers: Rina Reyna MD; Paco Baker DO    Procedure: EGD    Location: [facility]            Relevant Problems   Anesthesia (within normal limits)      Liver   (+) Acute pancreatitis, unspecified complication status, unspecified pancreatitis type ([facility]-HCC)       Clinical information reviewed:   Tobacco  Allergies  Meds  Problems  Med Hx  Surg Hx   Fam Hx  Soc   Hx        NPO Detail:  No data recorded     Physical Exam    Airway  Mallampati: II  TM distance: >3 FB     Cardiovascular    Dental    Pulmonary    Abdominal            Anesthesia Plan    History of general anesthesia?: yes  History of complications of general anesthesia?: no    ASA 3     MAC     intravenous induction   Anesthetic plan and risks discussed with patient.

## 2024-05-10 NOTE — PROGRESS NOTES
"Nikhil Underwood is a 74 y.o. male on day 2 of admission presenting with Abdominal pain.      Subjective   Pt tearful this am. States \"is sad\". He is NPO and awaiting EGD.  Denies abd pain at this time.     Objective     Last Recorded Vitals  /76 (BP Location: Right arm, Patient Position: Lying)   Pulse 76   Temp 36.9 °C (98.4 °F) (Oral)   Resp 16   Wt 104 kg (230 lb)   SpO2 96%   Intake/Output last 3 Shifts:    Intake/Output Summary (Last 24 hours) at 5/10/2024 0946  Last data filed at 5/9/2024 2250  Gross per 24 hour   Intake 600 ml   Output 1475 ml   Net -875 ml       Admission Weight  Weight: 104 kg (230 lb) (05/08/24 1813)    Daily Weight  05/08/24 : 104 kg (230 lb)    Image Results  ECG 12 Lead    Result Date: 5/9/2024  Normal sinus rhythm Possible Left atrial enlargement Left axis deviation Inferior infarct (cited on or before 28-AUG-2020) Abnormal ECG When compared with ECG of 28-AUG-2020 12:42, Inferior and lateral ST abnormality no longer evident Confirmed by Cody Corado ([id number]) on 5/9/2024 7:48:59 AM    CT abdomen pelvis w IV contrast    Addendum Date: 5/8/2024    Interpreted By:  Conrado Haile, ADDENDUM: As described in the radiology report, there is a 2.8 cm x 1.3 cm density adjacent to the distal esophagus which is stable in size comparison to the 4/7/2023 CT examination. On review of the 7/24/2020 CT chest examination, the patient previously had a large pericardial effusion and there was hyperdense fluid in this region on the remote prior CT chest examination, and this finding may correspond to mild residual loculated complex fluid. Attention on progress imaging is however recommended to exclude other underlying pathology including enlarged lymph node.     Signed by: Conrado Haile 5/8/2024 11:26 PM   -------- ORIGINAL REPORT -------- Dictation workstation:   [id number]    Result Date: 5/8/2024  Interpreted By:  Conrado Haile, STUDY: CT ABDOMEN PELVIS W IV CONTRAST;  5/8/2024 8:52 pm   " INDICATION: Signs/Symptoms:diffuse abd pain.   COMPARISON: 4/7/2023   ACCESSION NUMBER(S): [id number]   ORDERING CLINICIAN: SHARON HOLT   TECHNIQUE: Contiguous axial images of the abdomen and pelvis were obtained after the intravenous administration of  contrast. Coronal and sagittal reformatted images were obtained from the axial images.   FINDINGS: There is limited evaluation of the lung bases. Bibasilar subsegmental atelectasis. No pleural effusion.   Stable 2.8 cm x 1.2 cm opacity along the right aspect of the distal esophagus.   There is hepatic steatosis. The gallbladder is present. No dilatation common bile duct.   There is edema and expansion of the distal pancreatic body and tail with hypoenhancement. There is mild edema adjacent to the pancreatic tail. 10 mm soft tissue nodular density to the pancreatic tail and spleen.   2.2 cm cystic lesion in the spleen.   The adrenal glands appear unremarkable.   There is atrophy and cortical scarring of the kidneys. There are several renal cysts and subcentimeter hypodensities too small to characterize. 7 mm nonobstructive left renal calculus. No hydronephrosis.   No evidence of bowel obstruction or acute appendicitis.   Urinary bladder appears unremarkable.   Prostatomegaly.   Multilevel bridging anterior osseous spurring of the lumbar spine.       Expansion of the distal pancreatic body and tail with hypoenhancement and mild surrounding edema, findings may relate to pancreatitis with necrotizing pancreatitis not excluded. Follow-up MRI is however recommended for further evaluation and to exclude other pathology including mass. 10 mm soft tissue nodular density between the pancreatic tail and spleen may correspond to a lymph node.   Bilateral renal atrophy and cortical scarring and small renal cysts and subcentimeter hypodensities too small to characterize. 7 mm nonobstructive left renal calculus.   Prostatomegaly; please correlate with PSA.   MACRO: Critical  Finding:  See findings. Notification was initiated on 5/8/2024 at 9:33 pm by  Conrado Haile.  (**-YCF-**) Instructions:   Signed by: Conrado Haile 5/8/2024 9:33 PM Dictation workstation:   [id number]    XR chest 2 views    Result Date: 5/8/2024  Interpreted By:  Conrado Haile, STUDY: XR CHEST 2 VIEWS;  5/8/2024 6:55 pm   INDICATION: Signs/Symptoms:chest pain.   COMPARISON: 9/22/2020   ACCESSION NUMBER(S): [id number]   ORDERING CLINICIAN: SHARON HOLT   FINDINGS: The cardiac silhouette is stable in size. There is mild basilar subsegmental atelectasis. No significant pleural effusion. No pneumothorax. Diffuse multilevel bridging anterior osseous spurring of the thoracic spine.       Mild basilar subsegmental atelectasis.   MACRO: None   Signed by: Conrado Haile 5/8/2024 7:13 PM Dictation workstation:   [id number]           Physical Exam  HENT:      Head: Normocephalic and atraumatic.      Nose: Nose normal.      Mouth/Throat:      Mouth: Mucous membranes are moist.      Pharynx: Oropharynx is clear.   Eyes:      Extraocular Movements: Extraocular movements intact.      Pupils: Pupils are equal, round, and reactive to light.   Cardiovascular:      Rate and Rhythm: Normal rate and regular rhythm.      Pulses: Normal pulses.   Pulmonary:      Effort: Pulmonary effort is normal.      Breath sounds: Normal breath sounds.   Abdominal:      General: Abdomen is flat. Bowel sounds are normal.      Palpations: Abdomen is soft.   Musculoskeletal:         General: Normal range of motion.   Skin:     General: Skin is warm and dry.      Capillary Refill: Capillary refill takes less than 2 seconds.   Neurological:      General: No focal deficit present.      Mental Status: He is oriented to person, place, and time.   Psychiatric:         Mood and Affect: Mood normal.     Relevant Results  Results for orders placed or performed during the hospital encounter of 05/08/24 (from the past 24 hour(s))   POCT GLUCOSE   Result Value Ref  Range    POCT Glucose 121 (H) 74 - 99 mg/dL   POCT GLUCOSE   Result Value Ref Range    POCT Glucose 119 (H) 74 - 99 mg/dL   POCT GLUCOSE   Result Value Ref Range    POCT Glucose 134 (H) 74 - 99 mg/dL   Comprehensive metabolic panel   Result Value Ref Range    Glucose 130 (H) 65 - 99 mg/dL    Sodium 139 133 - 145 mmol/L    Potassium 4.6 3.4 - 5.1 mmol/L    Chloride 104 97 - 107 mmol/L    Bicarbonate 25 24 - 31 mmol/L    Urea Nitrogen 22 8 - 25 mg/dL    Creatinine 1.60 0.40 - 1.60 mg/dL    eGFR 45 (L) >60 mL/min/1.73m*2    Calcium 9.0 8.5 - 10.4 mg/dL    Albumin 3.6 3.5 - 5.0 g/dL    Alkaline Phosphatase 80 35 - 125 U/L    Total Protein 6.2 5.9 - 7.9 g/dL    AST 14 5 - 40 U/L    Bilirubin, Total 0.4 0.1 - 1.2 mg/dL    ALT <5 (L) 5 - 40 U/L    Anion Gap 10 <=19 mmol/L   CBC   Result Value Ref Range    WBC 4.3 (L) 4.4 - 11.3 x10*3/uL    nRBC 0.0 0.0 - 0.0 /100 WBCs    RBC 4.55 4.50 - 5.90 x10*6/uL    Hemoglobin 13.5 13.5 - 17.5 g/dL    Hematocrit 43.7 41.0 - 52.0 %    MCV 96 80 - 100 fL    MCH 29.7 26.0 - 34.0 pg    MCHC 30.9 (L) 32.0 - 36.0 g/dL    RDW 13.9 11.5 - 14.5 %    Platelets 113 (L) 150 - 450 x10*3/uL   POCT GLUCOSE   Result Value Ref Range    POCT Glucose 127 (H) 74 - 99 mg/dL     Assessment/Plan   Epigastric pain/chest pain  -Possibly secondary to esophageal mass  -CT shows possible esophageal and pancreatic masses   -MRCP shows 6 cm hypoenhancing region in the pancreatic body/tail which appears masslike with encasement and occlusion of the splenic vein. CA vs chronic pancreatitis   -GI follows. Pt to have EGD today  -Cardiology saw, doubts acute coronary syndrome  -Ca 19-9 pending    DM  -Holding oral meds for now  -Insulin SS   -Monitor blood glucose    Schizophrenia  -Continue home meds    CKD stage III  -Creatinine at baseline, monitor     MRDD  -Lives in group home    Atrial Fibrillation  -Continue BB and Eliquis. Eliquis held this am for EGD    DVT Prophylaxis  -Eliquis    Dispo  Back to group home on  discharge      Amara Gaming, APRN-CNP

## 2024-05-10 NOTE — PROGRESS NOTES
Nikhil Underwood is a 74 y.o. male on day 2 of admission presenting with Abdominal pain.    Subjective   Patient denies any abdominal pain, NV. He wants juice       Objective     Physical Exam  Vitals reviewed.   Constitutional:       General: He is awake.      Appearance: Normal appearance.   HENT:      Head: Normocephalic and atraumatic.      Mouth/Throat:      Mouth: Mucous membranes are moist.   Cardiovascular:      Rate and Rhythm: Normal rate and regular rhythm.   Pulmonary:      Effort: Pulmonary effort is normal.      Breath sounds: Normal breath sounds.   Abdominal:      General: There is distension.      Palpations: Abdomen is soft.      Tenderness: There is no abdominal tenderness. There is no guarding.   Musculoskeletal:      Cervical back: Normal range of motion and neck supple.   Skin:     General: Skin is warm and dry.   Neurological:      General: No focal deficit present.      Mental Status: He is alert and oriented to person, place, and time. Mental status is at baseline.   Psychiatric:         Attention and Perception: Attention and perception normal.         Mood and Affect: Mood normal.         Behavior: Behavior normal.         Last Recorded Vitals  Blood pressure 144/76, pulse 76, temperature 36.9 °C (98.4 °F), temperature source Oral, resp. rate 16, height 1.829 m (6'), weight 104 kg (230 lb), SpO2 96%.  Intake/Output last 3 Shifts:  I/O last 3 completed shifts:  In: 1853.3 (17.8 mL/kg) [P.O.:1560; I.V.:293.3 (2.8 mL/kg)]  Out: 2625 (25.2 mL/kg) [Urine:2625 (0.7 mL/kg/hr)]  Weight: 104.3 kg     Relevant Results                Results for orders placed or performed during the hospital encounter of 05/08/24 (from the past 24 hour(s))   POCT GLUCOSE   Result Value Ref Range    POCT Glucose 121 (H) 74 - 99 mg/dL   POCT GLUCOSE   Result Value Ref Range    POCT Glucose 119 (H) 74 - 99 mg/dL   POCT GLUCOSE   Result Value Ref Range    POCT Glucose 134 (H) 74 - 99 mg/dL   Comprehensive metabolic panel    Result Value Ref Range    Glucose 130 (H) 65 - 99 mg/dL    Sodium 139 133 - 145 mmol/L    Potassium 4.6 3.4 - 5.1 mmol/L    Chloride 104 97 - 107 mmol/L    Bicarbonate 25 24 - 31 mmol/L    Urea Nitrogen 22 8 - 25 mg/dL    Creatinine 1.60 0.40 - 1.60 mg/dL    eGFR 45 (L) >60 mL/min/1.73m*2    Calcium 9.0 8.5 - 10.4 mg/dL    Albumin 3.6 3.5 - 5.0 g/dL    Alkaline Phosphatase 80 35 - 125 U/L    Total Protein 6.2 5.9 - 7.9 g/dL    AST 14 5 - 40 U/L    Bilirubin, Total 0.4 0.1 - 1.2 mg/dL    ALT <5 (L) 5 - 40 U/L    Anion Gap 10 <=19 mmol/L   CBC   Result Value Ref Range    WBC 4.3 (L) 4.4 - 11.3 x10*3/uL    nRBC 0.0 0.0 - 0.0 /100 WBCs    RBC 4.55 4.50 - 5.90 x10*6/uL    Hemoglobin 13.5 13.5 - 17.5 g/dL    Hematocrit 43.7 41.0 - 52.0 %    MCV 96 80 - 100 fL    MCH 29.7 26.0 - 34.0 pg    MCHC 30.9 (L) 32.0 - 36.0 g/dL    RDW 13.9 11.5 - 14.5 %    Platelets 113 (L) 150 - 450 x10*3/uL   POCT GLUCOSE   Result Value Ref Range    POCT Glucose 127 (H) 74 - 99 mg/dL     MRCP pancreas w and wo IV contrast    Result Date: 5/10/2024  Interpreted By:  Shorty Peace, STUDY: MRCP PANCREAS W AND WO IV CONTRAST;  5/9/2024 9:09 pm   INDICATION: Signs/Symptoms:mass vs nec panc with epigastric pain.   COMPARISON: CT from the prior day   ACCESSION NUMBER(S): [id number]   ORDERING CLINICIAN: ROLF BEYER   TECHNIQUE: MRI PANCREAS; Multiplanar magnetic resonance images of the abdomen were obtained including the following sequences; T2-weighted SSFSE with and without fat saturation, T1-weighted GRE in/opposed phase, DWI, fat saturated 3D-T1w GRE pre and dynamically post contrast. Radial thick slab T2w RARE MRCP and coronally reconstructed navigator gated high resolution 3-D T2w RESTORE MRCP with MIP reconstruction were also performed for MRCP.  20 ml of  Gadolinium contrast agent Dotarem were administered intravenously without immediate complication.   FINDINGS: Severely motion degraded exam.   LIVER: Signal dropout on out of phase  imaging suggesting a ptosis. No definite focal lesion identified on severely motion degraded images.   BILE DUCTS: No definite filling defect, stricture, or dilatation identified on severely motion degraded images.   GALLBLADDER: No stone or wall thickening.   PANCREAS: There is a 58 x 20 mm T1 hypointense hypoenhancing region in the body/tail. This is difficult to characterize on T2 weighted sequences but probably T2 hypointense. This appears to encase and occlude the splenic vein. This area appears to demonstrate some delayed enhancement. No definite restricted diffusion is identified.   SPLEEN: Small cyst or pseudocyst in the upper pole. Otherwise grossly unremarkable.   ADRENAL GLANDS: Unremarkable.   KIDNEYS: Multifocal cortical scarring of the left kidney. Scattered simple appearing cortical cysts bilaterally. No hydronephrosis.   LYMPH NODES: No lymphadenopathy.   ABDOMINAL VESSELS: Abdominal aorta is patent without aneurysm. The major visceral arterial branches appear patent. IVC is grossly patent. No definite acute portal venous thrombosis identified.   BOWEL: No dilated bowel is visualized.   PERITONEUM/RETROPERITONEUM: No visualized free fluid.   BONES AND LOWER THORAX: Mild lumbar degenerative changes. Mild bibasilar atelectasis.       1.  6 cm hypoenhancing region in the pancreatic body/tail which appears masslike with encasement and occlusion of the splenic vein. These imaging features can be seen with adenocarcinoma, however there are also some imaging features which are not entirely consistent with this. For example there appears to be delayed enhancement within this region as well as no restricted diffusion. This could also be related to fibrosis secondary to chronic pancreatitis. There do not appear findings of acute pancreatitis at this time. Please note evaluation on this exam is severely limited due to motion artifact. Consider endoscopic ultrasound with tissue sampling for further evaluation.  2. Hepatic steatosis. 3. No definite metastatic disease of the abdomen otherwise.     MACRO: None   Signed by: Shorty Peace 5/10/2024 9:37 AM Dictation workstation:   [id number]    Transthoracic Echo (TTE) Complete    Result Date: 5/9/2024            [facility] [address] [place] [id number]             Phone [phone] TRANSTHORACIC ECHOCARDIOGRAM REPORT  Patient Name:      CHUCK DEL RIO          Reading Physician:    [id number] Michael Kate DO Study Date:        5/9/2024              Ordering Provider:    [id number] LINDA BARRETO MRN/PID:           [id number]              Fellow: Accession#:        [id number]          Nurse: Date of Birth/Age: 1950 / 74 years  Sonographer:          Kristina Ricci RDCS Gender:            M                     Additional Staff: Height:            182.88 cm             Admit Date: Weight:            104.33 kg             Admission Status:     Inpatient -                                                                Routine BSA / BMI:         2.26 m2 / 31.19 kg/m2 Department Location:  [facility] Blood Pressure: 150 /80 mmHg Study Type:    TRANSTHORACIC ECHO (TTE) COMPLETE Diagnosis/ICD: Chest pain, unspecified-R07.9 Indication:    CP CPT Codes:     Echo Complete w Full [id number] Patient History: No obtainable past medical history. Pertinent History: Chest Pain and Abd pain. Study Detail: The following Echo studies were performed: 2D, M-Mode, Doppler and               color flow.  PHYSICIAN INTERPRETATION: Left Ventricle: Left ventricular systolic function is normal, with an estimated ejection fraction of 60-65%. There are no regional wall motion abnormalities. The left ventricular cavity size is normal. Spectral Doppler shows an impaired relaxation pattern of  left ventricular diastolic filling. Left Atrium: The left atrium is normal in size. Right Ventricle: The right ventricle is normal in size. There is normal right ventricular global systolic function. Right Atrium: The right atrium is normal in size. Aortic Valve: The aortic valve appears structurally normal. There is no evidence of aortic valve regurgitation. The peak instantaneous gradient of the aortic valve is 5.5 mmHg. Mitral Valve: The mitral valve is normal in structure. There is trace to mild mitral valve regurgitation. Tricuspid Valve: The tricuspid valve is structurally normal. No evidence of tricuspid regurgitation. Pulmonic Valve: The pulmonic valve is structurally normal. There is trace pulmonic valve regurgitation. Pericardium: There is no pericardial effusion noted. Aorta: The aortic root is abnormal. There is mild dilatation of the ascending aorta.  CONCLUSIONS:  1. Left ventricular systolic function is normal with a 60-65% estimated ejection fraction.  2. Spectral Doppler shows an impaired relaxation pattern of left ventricular diastolic filling. QUANTITATIVE DATA SUMMARY: 2D MEASUREMENTS:                          Normal Ranges: LAs:           2.10 cm   (2.7-4.0cm) IVSd:          0.91 cm   (0.6-1.1cm) LVPWd:         0.85 cm   (0.6-1.1cm) LVIDd:         4.46 cm   (3.9-5.9cm) LVIDs:         2.67 cm LV Mass Index: 56.0 g/m2 LV % FS        40.1 % LA VOLUME:                              Normal Ranges: LA Vol A4C:        44.0 ml   (22+/-6mL/m2) LA Vol Index A4C:  19.4ml/m2 LA Area A4C:       16.0 cm2 LA Major Axis A4C: 5.0 cm LA Vol A4C:        41.8 ml M-MODE MEASUREMENTS:                  Normal Ranges: Ao Root: 2.90 cm (2.0-3.7cm) AORTA MEASUREMENTS:                    Normal Ranges: Asc Ao, d: 3.80 cm (2.1-3.4cm) LV SYSTOLIC FUNCTION BY 2D PLANIMETRY (MOD):                     Normal Ranges: EF-A4C View: 68.3 % (>=55%) LV DIASTOLIC FUNCTION:                        Normal Ranges: MV Peak E:    0.71 m/s  (0.7-1.2 m/s) MV Peak A:    1.00 m/s (0.42-0.7 m/s) E/A Ratio:    0.71     (1.0-2.2) MV e'         0.05 m/s (>8.0) MV lateral e' 0.06 m/s MV medial e'  0.04 m/s E/e' Ratio:   14.16    (<8.0) MITRAL VALVE:                 Normal Ranges: MV DT: 277 msec (150-240msec) AORTIC VALVE:                         Normal Ranges: AoV Vmax:      1.17 m/s (<=1.7m/s) AoV Peak P.5 mmHg (<20mmHg) LVOT Max Lucio:  1.41 m/s (<=1.1m/s) LVOT Diameter: 2.20 cm  (1.8-2.4cm) AoV Area,Vmax: 4.58 cm2 (2.5-4.5cm2) AORTIC INSUFFICIENCY: AI Vmax:       3.28 m/s AI Half-time:  805 msec AI Decel Rate: 114.00 cm/s2  RIGHT VENTRICLE: RV Basal 3.32 cm RV Mid   2.22 cm RV Major 3.6 cm TAPSE:   18.0 mm RV s'    0.12 m/s  [id number] Michael Kate DO Electronically signed on 2024 at 12:18:57 PM  ** Final **     ECG 12 Lead    Result Date: 2024  Normal sinus rhythm Possible Left atrial enlargement Left axis deviation Inferior infarct (cited on or before 28-AUG-2020) Abnormal ECG When compared with ECG of 28-AUG-2020 12:42, Inferior and lateral ST abnormality no longer evident Confirmed by Cody Corado ([id number]) on 2024 7:48:59 AM    CT abdomen pelvis w IV contrast    Addendum Date: 2024    Interpreted By:  Conrado Haile, ADDENDUM: As described in the radiology report, there is a 2.8 cm x 1.3 cm density adjacent to the distal esophagus which is stable in size comparison to the 2023 CT examination. On review of the 2020 CT chest examination, the patient previously had a large pericardial effusion and there was hyperdense fluid in this region on the remote prior CT chest examination, and this finding may correspond to mild residual loculated complex fluid. Attention on progress imaging is however recommended to exclude other underlying pathology including enlarged lymph node.     Signed by: Conrado Haile 2024 11:26 PM   -------- ORIGINAL REPORT -------- Dictation workstation:   [id number]    Result Date: 2024  Interpreted By:   Conrado Haile, STUDY: CT ABDOMEN PELVIS W IV CONTRAST;  5/8/2024 8:52 pm   INDICATION: Signs/Symptoms:diffuse abd pain.   COMPARISON: 4/7/2023   ACCESSION NUMBER(S): [id number]   ORDERING CLINICIAN: SHARON HOLT   TECHNIQUE: Contiguous axial images of the abdomen and pelvis were obtained after the intravenous administration of  contrast. Coronal and sagittal reformatted images were obtained from the axial images.   FINDINGS: There is limited evaluation of the lung bases. Bibasilar subsegmental atelectasis. No pleural effusion.   Stable 2.8 cm x 1.2 cm opacity along the right aspect of the distal esophagus.   There is hepatic steatosis. The gallbladder is present. No dilatation common bile duct.   There is edema and expansion of the distal pancreatic body and tail with hypoenhancement. There is mild edema adjacent to the pancreatic tail. 10 mm soft tissue nodular density to the pancreatic tail and spleen.   2.2 cm cystic lesion in the spleen.   The adrenal glands appear unremarkable.   There is atrophy and cortical scarring of the kidneys. There are several renal cysts and subcentimeter hypodensities too small to characterize. 7 mm nonobstructive left renal calculus. No hydronephrosis.   No evidence of bowel obstruction or acute appendicitis.   Urinary bladder appears unremarkable.   Prostatomegaly.   Multilevel bridging anterior osseous spurring of the lumbar spine.       Expansion of the distal pancreatic body and tail with hypoenhancement and mild surrounding edema, findings may relate to pancreatitis with necrotizing pancreatitis not excluded. Follow-up MRI is however recommended for further evaluation and to exclude other pathology including mass. 10 mm soft tissue nodular density between the pancreatic tail and spleen may correspond to a lymph node.   Bilateral renal atrophy and cortical scarring and small renal cysts and subcentimeter hypodensities too small to characterize. 7 mm nonobstructive left renal  calculus.   Prostatomegaly; please correlate with PSA.   MACRO: Critical Finding:  See findings. Notification was initiated on 5/8/2024 at 9:33 pm by  Conrado Haile.  (**-YCF-**) Instructions:   Signed by: Conrado Haile 5/8/2024 9:33 PM Dictation workstation:   [id number]    XR chest 2 views    Result Date: 5/8/2024  Interpreted By:  Conrado Haile, STUDY: XR CHEST 2 VIEWS;  5/8/2024 6:55 pm   INDICATION: Signs/Symptoms:chest pain.   COMPARISON: 9/22/2020   ACCESSION NUMBER(S): [id number]   ORDERING CLINICIAN: SHARON HOLT   FINDINGS: The cardiac silhouette is stable in size. There is mild basilar subsegmental atelectasis. No significant pleural effusion. No pneumothorax. Diffuse multilevel bridging anterior osseous spurring of the thoracic spine.       Mild basilar subsegmental atelectasis.   MACRO: None   Signed by: Conrado Haile 5/8/2024 7:13 PM Dictation workstation:   [id number]                Assessment/Plan   Principal Problem:    Abdominal pain  Active Problems:    Acute pancreatitis, unspecified complication status, unspecified pancreatitis type ([facility])    Epigastric Pain, Abnormal CT, Dysphagia               -There are two different areas on concern on imaging              1. Esophagus ? Mass with chronic dysphagia. We are planning EGD today               2. Pancreas ?mass vs necrotizing panc. MRCP reviewed with 6 cm hypoenhancing tail/body lesion with encasement and occlusion of the splenic vein. Unable to confirm malignancy, recommend EUS. Pending Ca 19-9              3. Enlarged prostate, normal PSA     Further recs to follow endoscopy per Dr Reyna this afternoon        I spent 20 minutes in the professional and overall care of this patient.      Claribel Leary, APRN-CNP

## 2024-05-14 LAB
LABORATORY COMMENT REPORT: NORMAL
PATH REPORT.FINAL DX SPEC: NORMAL
PATH REPORT.GROSS SPEC: NORMAL
PATH REPORT.RELEVANT HX SPEC: NORMAL
PATH REPORT.TOTAL CANCER: NORMAL

## 2024-05-21 NOTE — DOCUMENTATION CLARIFICATION NOTE
"    PATIENT:               NIKHIL UNDERWOOD  ACCT #:                  [id number]  MRN:                       [id number]  :                       1950  ADMIT DATE:       2024 6:06 PM  DISCH DATE:        5/10/2024 3:16 PM  RESPONDING PROVIDER #:        [id number]          PROVIDER RESPONSE TEXT:    Unknown etiology    CDI QUERY TEXT:    Clarification      Instruction:    Based on your assessment of the patient and the clinical information, please provide the requested documentation by clicking on the appropriate radio button and enter any additional information if prompted.    Question: Please clarify if a relationship exists between    When answering this query, please exercise your independent professional judgment. The fact that a question is being asked, does not imply that any particular answer is desired or expected.    The patient's clinical indicators include:  Clinical Information:  Nikhil Underwood is a 74 y.o. male presenting with chest pain vs epigastric pain.    Clinical Indicators:  Tropononin:    Lipase:  19  Bili:  0.3   CT ABD/Pelvis:  Expansion of the distal pancreatic body and tail with hypoenhancement  and mild surrounding edema, findings may relate to pancreatitis with  necrotizing pancreatitis not excluded.Follow-up MRI is however  recommended for further evaluation and to exclude other pathology  including mass.10 mm soft tissue nodular density between the  pancreatic tail and spleen may correspond to a lymph node.   H/P \"During exam patient denied any chest pain.\"     MRCP:  \"1. 6 cm hypoenhancing region in the pancreatic body/tail which  appears masslike with encasement and occlusion of the splenic vein.  These imaging features can be seen with adenocarcinoma, however there  are also some imaging features which are not entirely consistent with  this.For example there appears to be delayed enhancement within this  region as well as no restricted diffusion.This could also be " "related  to fibrosis secondary to chronic pancreatitis.There do not appear  findings of acute pancreatitis at this time.\"    5/9 Cards Consult:  \"4. noncardiac chest discomfort.\"    5/10 GI:  \" Acute pancreatitis, unspecified complication status, unspecified pancreatitis type ([facility]-HCC)\"    5/10 EGD:  Surgical Path Study:  \"A. 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    5/10 DC Summary:  Mass vs chronic pancreatitis.    Treatment:  EGD; Cardiac CASTELLANOS; GI Consult; Cardiology Consult; TTE; CXR; CT ABD/Pelvis; Ca 19; EUS outpatient    Risk Factors:  HTN; Obesity; Schizophrenia; Intellectual Disability; MRDD; Chronic Dysphagia  Options provided:  -- Abd pain  related to lymphangiectasia.  -- Abd pain  related to chronic pancreatitis  -- Abd pain  related to lymphangiectasia, chronic pancreatitis  -- Other - I will add my own diagnosis  -- Refer to Clinical Documentation Reviewer    Query created by: Samaria Kevin on 5/20/2024 8:51 AM      Electronically signed by:  AMARILIS BOLDEN-CNP 5/21/2024 9:49 AM          "

## 2024-06-17 ENCOUNTER — DOCUMENTATION (OUTPATIENT)
Dept: GASTROENTEROLOGY | Facility: HOSPITAL | Age: 74
End: 2024-06-17
Payer: MEDICARE

## 2024-06-17 NOTE — PROGRESS NOTES
Dr. Flores's office received a referral from Dr. Rina Reyna for this patient to undergo an EUS. Dr. Peter reviewed the referral on 6/17/2024. Per Dr. Peter, OK to schedule an EUS possible FNA.    [facility] schedulers were notified to call and schedule this patient. Contact Patricia Baxter RN at [phone] for any questions.      See below for supporting clinical information from the referral sent by Dr. Reyna's office:    ED to Hosp-Admission 5/8/2024- 5/10/2024:  Hospital Course  Pt with MRDD admitted for epigastric/chest pain. CT showed possible esophageal and pancreatic masses. MRCP was done and shows 6 cm hypoenhancing region in the pancreatic body/tail which appears masslike with encasement and occlusion of the splenic vein. Mass vs chronic pancreatitis. Was evaluated by Gastroenterology and underwent and EGD which showed no gastric/esophageal masses. A hiatal hernia was found and pt was also found to have polyps. Biopsies were taken. Cardiology saw, doubts acute coronary syndrome. Ca 19-9 pending. Pt will need to follow up outpatient for EUS and biopsy of pancreatic mass. Medically stable for [facility] back to [facility]. Guardian/Brother Avelino was updated and educated on above. Notified of need to follow up with GI.     MRCP pancreas w and wo IV contrast  Status: Final result     PACS Images     Show images for MRCP pancreas w and wo IV contrast      Study Result    Narrative & Impression   Interpreted By:  Shorty Peace,   STUDY:  MRCP PANCREAS W AND WO IV CONTRAST;  5/9/2024 9:09 pm      INDICATION:  Signs/Symptoms:mass vs nec panc with epigastric pain.      COMPARISON:  CT from the prior day      ACCESSION NUMBER(S):  [id number]      ORDERING CLINICIAN:  ROLF BEYER      TECHNIQUE:  MRI PANCREAS; Multiplanar magnetic resonance images of the abdomen  were obtained including the following sequences; T2-weighted SSFSE  with and without fat saturation, T1-weighted GRE in/opposed phase,  DWI, fat  saturated 3D-T1w GRE pre and dynamically post contrast.  Radial thick slab T2w RARE MRCP and coronally reconstructed navigator  gated high resolution 3-D T2w RESTORE MRCP with MIP reconstruction  were also performed for MRCP.  20 ml of  Gadolinium contrast agent  Dotarem were administered intravenously without immediate  complication.      FINDINGS:  Severely motion degraded exam.      LIVER:  Signal dropout on out of phase imaging suggesting a ptosis. No  definite focal lesion identified on severely motion degraded images.      BILE DUCTS:  No definite filling defect, stricture, or dilatation identified on  severely motion degraded images.      GALLBLADDER:  No stone or wall thickening.      PANCREAS:  There is a 58 x 20 mm T1 hypointense hypoenhancing region in the  body/tail. This is difficult to characterize on T2 weighted sequences  but probably T2 hypointense. This appears to encase and occlude the  splenic vein. This area appears to demonstrate some delayed  enhancement. No definite restricted diffusion is identified.      SPLEEN:  Small cyst or pseudocyst in the upper pole. Otherwise grossly  unremarkable.      ADRENAL GLANDS:  Unremarkable.      KIDNEYS:  Multifocal cortical scarring of the left kidney. Scattered simple  appearing cortical cysts bilaterally. No hydronephrosis.      LYMPH NODES:  No lymphadenopathy.      ABDOMINAL VESSELS:  Abdominal aorta is patent without aneurysm. The major visceral  arterial branches appear patent. IVC is grossly patent. No definite  acute portal venous thrombosis identified.      BOWEL:  No dilated bowel is visualized.      PERITONEUM/RETROPERITONEUM:  No visualized free fluid.      BONES AND LOWER THORAX:  Mild lumbar degenerative changes. Mild bibasilar atelectasis.      IMPRESSION:  1.  6 cm hypoenhancing region in the pancreatic body/tail which  appears masslike with encasement and occlusion of the splenic vein.  These imaging features can be seen with  adenocarcinoma, however there  are also some imaging features which are not entirely consistent with  this. For example there appears to be delayed enhancement within this  region as well as no restricted diffusion. This could also be related  to fibrosis secondary to chronic pancreatitis. There do not appear  findings of acute pancreatitis at this time. Please note evaluation  on this exam is severely limited due to motion artifact. Consider  endoscopic ultrasound with tissue sampling for further evaluation.  2. Hepatic steatosis.  3. No definite metastatic disease of the abdomen otherwise.          CT abdomen pelvis w IV contrast 5/8/2024:  IMPRESSION:  Expansion of the distal pancreatic body and tail with hypoenhancement  and mild surrounding edema, findings may relate to pancreatitis with  necrotizing pancreatitis not excluded. Follow-up MRI is however  recommended for further evaluation and to exclude other pathology  including mass. 10 mm soft tissue nodular density between the  pancreatic tail and spleen may correspond to a lymph node.      Bilateral renal atrophy and cortical scarring and small renal cysts  and subcentimeter hypodensities too small to characterize. 7 mm  nonobstructive left renal calculus.      Cancer Antigen 19-9  Order: [id number]   Status: Final result       Visible to patient: No (inaccessible in [facility])    0 Result Notes      Component  Ref Range & Units 1 mo ago   Cancer AG 19-9  <35.00 U/mL 17.47        -LFTs 5/10/2024:  Alk phos: 80  Bilirubin, Total: 0.4  AST: 14  ALT: <5    -Surgical Pathology Exam 5/10/2024:  FINAL DIAGNOSIS   A. 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

## 2024-10-03 RX ORDER — SODIUM CHLORIDE, SODIUM LACTATE, POTASSIUM CHLORIDE, CALCIUM CHLORIDE 600; 310; 30; 20 MG/100ML; MG/100ML; MG/100ML; MG/100ML
20 INJECTION, SOLUTION INTRAVENOUS CONTINUOUS
OUTPATIENT
Start: 2024-10-03

## 2024-10-04 ENCOUNTER — ANESTHESIA EVENT (OUTPATIENT)
Dept: GASTROENTEROLOGY | Facility: HOSPITAL | Age: 74
End: 2024-10-04

## 2024-10-04 ENCOUNTER — ANESTHESIA (OUTPATIENT)
Dept: GASTROENTEROLOGY | Facility: HOSPITAL | Age: 74
End: 2024-10-04
Payer: MEDICARE

## 2024-10-04 ENCOUNTER — APPOINTMENT (OUTPATIENT)
Dept: GASTROENTEROLOGY | Facility: HOSPITAL | Age: 74
End: 2024-10-04
Payer: MEDICARE

## 2024-12-12 NOTE — H&P
History Of Present Illness  Nikhil Underwood is a 74 y.o. male presenting with pancreatic body mass.     Past Medical History  No past medical history on file.  Surgical History  No past surgical history on file.  Social History  He reports that he has never smoked. He has never used smokeless tobacco. He reports that he does not drink alcohol and does not use drugs.    Family History  No family history on file.     Allergies  No Known Allergies  Review of Systems     Physical Exam     Last Recorded Vitals  There were no vitals taken for this visit.    Assessment/Plan   Pancreatic body mass    Proceed with EUS     Grover Peter MD

## 2024-12-13 ENCOUNTER — ANESTHESIA (OUTPATIENT)
Dept: GASTROENTEROLOGY | Facility: HOSPITAL | Age: 74
End: 2024-12-13
Payer: MEDICARE

## 2024-12-13 ENCOUNTER — HOSPITAL ENCOUNTER (OUTPATIENT)
Dept: GASTROENTEROLOGY | Facility: HOSPITAL | Age: 74
Discharge: HOME | End: 2024-12-13
Payer: MEDICARE

## 2024-12-13 ENCOUNTER — ANESTHESIA EVENT (OUTPATIENT)
Dept: GASTROENTEROLOGY | Facility: HOSPITAL | Age: 74
End: 2024-12-13
Payer: MEDICARE

## 2024-12-13 VITALS
HEART RATE: 64 BPM | WEIGHT: 213.63 LBS | TEMPERATURE: 97.2 F | HEIGHT: 67 IN | RESPIRATION RATE: 21 BRPM | BODY MASS INDEX: 33.53 KG/M2 | OXYGEN SATURATION: 100 % | SYSTOLIC BLOOD PRESSURE: 130 MMHG | DIASTOLIC BLOOD PRESSURE: 71 MMHG

## 2024-12-13 DIAGNOSIS — K86.89 PANCREATIC MASS (HHS-HCC): ICD-10-CM

## 2024-12-13 PROCEDURE — 7100000010 HC PHASE TWO TIME - EACH INCREMENTAL 1 MINUTE

## 2024-12-13 PROCEDURE — 7100000009 HC PHASE TWO TIME - INITIAL BASE CHARGE

## 2024-12-13 PROCEDURE — 43238 EGD US FINE NEEDLE BX/ASPIR: CPT | Performed by: INTERNAL MEDICINE

## 2024-12-13 PROCEDURE — 2720000007 HC OR 272 NO HCPCS

## 2024-12-13 PROCEDURE — 2500000004 HC RX 250 GENERAL PHARMACY W/ HCPCS (ALT 636 FOR OP/ED): Performed by: NURSE ANESTHETIST, CERTIFIED REGISTERED

## 2024-12-13 PROCEDURE — 3700000002 HC GENERAL ANESTHESIA TIME - EACH INCREMENTAL 1 MINUTE

## 2024-12-13 PROCEDURE — 43239 EGD BIOPSY SINGLE/MULTIPLE: CPT | Performed by: INTERNAL MEDICINE

## 2024-12-13 PROCEDURE — 3700000001 HC GENERAL ANESTHESIA TIME - INITIAL BASE CHARGE

## 2024-12-13 RX ORDER — SODIUM CHLORIDE 0.9 % (FLUSH) 0.9 %
SYRINGE (ML) INJECTION AS NEEDED
Status: DISCONTINUED | OUTPATIENT
Start: 2024-12-13 | End: 2024-12-13

## 2024-12-13 RX ORDER — SODIUM CHLORIDE, SODIUM LACTATE, POTASSIUM CHLORIDE, CALCIUM CHLORIDE 600; 310; 30; 20 MG/100ML; MG/100ML; MG/100ML; MG/100ML
20 INJECTION, SOLUTION INTRAVENOUS CONTINUOUS
Status: DISCONTINUED | OUTPATIENT
Start: 2024-12-13 | End: 2024-12-14 | Stop reason: HOSPADM

## 2024-12-13 RX ORDER — PROPOFOL 10 MG/ML
INJECTION, EMULSION INTRAVENOUS AS NEEDED
Status: DISCONTINUED | OUTPATIENT
Start: 2024-12-13 | End: 2024-12-13

## 2024-12-13 RX ORDER — MIDAZOLAM HYDROCHLORIDE 1 MG/ML
INJECTION INTRAMUSCULAR; INTRAVENOUS AS NEEDED
Status: DISCONTINUED | OUTPATIENT
Start: 2024-12-13 | End: 2024-12-13

## 2024-12-13 ASSESSMENT — COLUMBIA-SUICIDE SEVERITY RATING SCALE - C-SSRS
2. HAVE YOU ACTUALLY HAD ANY THOUGHTS OF KILLING YOURSELF?: NO
1. IN THE PAST MONTH, HAVE YOU WISHED YOU WERE DEAD OR WISHED YOU COULD GO TO SLEEP AND NOT WAKE UP?: NO
6. HAVE YOU EVER DONE ANYTHING, STARTED TO DO ANYTHING, OR PREPARED TO DO ANYTHING TO END YOUR LIFE?: NO

## 2024-12-13 ASSESSMENT — PAIN SCALES - GENERAL
PAINLEVEL_OUTOF10: 0 - NO PAIN

## 2024-12-13 ASSESSMENT — PAIN SCALES - PAIN ASSESSMENT IN ADVANCED DEMENTIA (PAINAD)
FACIALEXPRESSION: SMILING OR INEXPRESSIVE
BODYLANGUAGE: RELAXED
BREATHING: NORMAL
CONSOLABILITY: NO NEED TO CONSOLE
TOTALSCORE: 0

## 2024-12-13 ASSESSMENT — PAIN - FUNCTIONAL ASSESSMENT
PAIN_FUNCTIONAL_ASSESSMENT: 0-10

## 2024-12-13 NOTE — ANESTHESIA POSTPROCEDURE EVALUATION
Patient: Nikhil Underwood    Procedure Summary       Date: 12/13/24 Room / Location: [facility]    Anesthesia Start: 1110 Anesthesia Stop: 1208    Procedure: ENDOSCOPIC ULTRASOUND (UPPER) Diagnosis: Pancreatic mass ([facility]-H[facility])    Scheduled Providers: Grover ePter MD; Adam Mcclain MD; YUAN Khan-CRNA; Kendra Russell RN Responsible Provider: Adam Mcclain MD    Anesthesia Type: MAC ASA Status: 3            Anesthesia Type: MAC    Vitals Value Taken Time   /71 12/13/24 1245   Temp 36.2 °C (97.2 °F) 12/13/24 1204   Pulse 64 12/13/24 1245   Resp 21 12/13/24 1245   SpO2 100 % 12/13/24 1242   Vitals shown include unfiled device data.    Anesthesia Post Evaluation    Patient location during evaluation: PACU  Patient participation: complete - patient participated  Level of consciousness: awake and alert  Pain management: adequate  Airway patency: patent  Cardiovascular status: acceptable and hemodynamically stable  Respiratory status: acceptable, spontaneous ventilation and nonlabored ventilation  Hydration status: acceptable  Postoperative Nausea and Vomiting: none        There were no known notable events for this encounter.

## 2024-12-13 NOTE — DISCHARGE INSTRUCTIONS

## 2024-12-13 NOTE — ANESTHESIA PREPROCEDURE EVALUATION
Patient: Nikhil Underwood    Procedure Information       Date/Time: 12/13/24 1100    Scheduled providers: Grover Peter MD; Adam Mcclain MD; YUAN Khan-CRNA; Kendra Russell RN    Procedure: ENDOSCOPIC ULTRASOUND (UPPER)    [facility]            Relevant Problems   Liver   (+) Acute pancreatitis, unspecified complication status, unspecified pancreatitis type ([facility]-HCC)       Clinical information reviewed:   Tobacco  Allergies  Meds   Med Hx  Surg Hx   Fam Hx  Soc Hx         Past Medical History:   Diagnosis Date    Atrial fibrillation (Multi)     CKD (chronic kidney disease)     Developmental delay     Gout     HTN (hypertension)     Parkinson's disease (Multi)     Pericardial effusion ([facility]-HCC) 2020    Schizophrenia       Past Surgical History:   Procedure Laterality Date    COLONOSCOPY      ESOPHAGOGASTRODUODENOSCOPY      EXCISION / REPAIR HYDROCELE PEDIATRIC  2018    HEMORRHOID SURGERY      PERICARDIAL WINDOW  07/27/2020    via left VATS    PILONIDAL CYST DRAINAGE Bilateral      Social History     Tobacco Use    Smoking status: Never    Smokeless tobacco: Never   Vaping Use    Vaping status: Never Used   Substance Use Topics    Alcohol use: Never    Drug use: Never      Current Outpatient Medications   Medication Instructions    acetaminophen (TYLENOL) 325 mg, Every 6 hours PRN    allopurinol (ZYLOPRIM) 100 mg, Daily    apixaban (ELIQUIS) 5 mg, 2 times daily    carbidopa-levodopa (Sinemet)  mg tablet 37.5 mg, 3 times daily    cariprazine (VRAYLAR) 6 mg, Daily RT    clonazePAM (KLONOPIN) 0.5 mg, 3 times daily    ergocalciferol (VITAMIN D-2) 50,000 Units, Once Weekly    ezetimibe (ZETIA) 10 mg, Daily    famotidine (PEPCID) 20 mg, oral, 2 times daily    fluticasone (Flonase) 50 mcg/actuation nasal spray 2 sprays, Daily    furosemide (LASIX) 20 mg, Daily    guaiFENesin (MUCINEX) 600 mg, Every 12 hours PRN    LACTOBACILLUS ACIDOPHILUS ORAL 2 capsules, 2 times daily     "losartan (COZAAR) 50 mg, Daily RT    metFORMIN (Glucophage) 500 mg tablet 1 tablet, Daily with evening meal    metoprolol tartrate (LOPRESSOR) 50 mg, 2 times daily    OLANZapine (ZyPREXA) 5 mg tablet 1 tablet, Every morning    OLANZapine zydis (ZYPREXA) 5 mg, Daily PRN    potassium chloride CR 10 mEq ER tablet 10 mEq, Daily RT    pravastatin (PRAVACHOL) 40 mg, Nightly    psyllium husk (MetamuciL) 3.4 gram/5.4 gram powder Daily RT    QUEtiapine (SEROQUEL) 400 mg, Nightly    tamsulosin (FLOMAX) 0.4 mg, Nightly    valproate (DEPAKENE) 500 mg, Every morning    valproate (DEPAKENE) 1,000 mg, Nightly      Allergies   Allergen Reactions    Lithium Unknown        Chemistry    Lab Results   Component Value Date/Time     05/10/2024 0438    K 4.6 05/10/2024 0438     05/10/2024 0438    CO2 25 05/10/2024 0438    BUN 22 05/10/2024 0438    CREATININE 1.60 05/10/2024 0438    Lab Results   Component Value Date/Time    CALCIUM 9.0 05/10/2024 0438    ALKPHOS 80 05/10/2024 0438    AST 14 05/10/2024 0438    ALT <5 (L) 05/10/2024 0438    BILITOT 0.4 05/10/2024 0438          Lab Results   Component Value Date    HGBA1C 6.5 (H) 11/07/2023     Lab Results   Component Value Date/Time    WBC 4.3 (L) 05/10/2024 0438    HGB 13.5 05/10/2024 0438    HCT 43.7 05/10/2024 0438     (L) 05/10/2024 0438     Lab Results   Component Value Date/Time    PROTIME 12.3 07/29/2020 0900    INR 1.1 07/29/2020 0900     No results found for: \"ABORH\"  No results found for this or any previous visit (from the past 4464 hours).  No results found for this or any previous visit from the past 1095 days.   Echo 5/9/2024:  Left Ventricle: Left ventricular systolic function is normal, with an estimated ejection fraction of 60-65%. There are no regional wall motion abnormalities. The left ventricular cavity size is normal. Spectral Doppler shows an impaired relaxation pattern of left ventricular diastolic filling.  Left Atrium: The left atrium is normal " in size.  Right Ventricle: The right ventricle is normal in size. There is normal right ventricular global systolic function.  Right Atrium: The right atrium is normal in size.  Aortic Valve: The aortic valve appears structurally normal. There is no evidence of aortic valve regurgitation. The peak instantaneous gradient of the aortic valve is 5.5 mmHg.  Mitral Valve: The mitral valve is normal in structure. There is trace to mild mitral valve regurgitation.  Tricuspid Valve: The tricuspid valve is structurally normal. No evidence of tricuspid regurgitation.  Pulmonic Valve: The pulmonic valve is structurally normal. There is trace pulmonic valve regurgitation.  Pericardium: There is no pericardial effusion noted.  Aorta: The aortic root is abnormal. There is mild dilatation of the ascending aorta.        CONCLUSIONS:   1. Left ventricular systolic function is normal with a 60-65% estimated ejection fraction.   2. Spectral Doppler shows an impaired relaxation pattern of left ventricular diastolic filling.    Nuclear stress 7/6/2021@[facility]:  CONCLUSIONS:    1. SPECT Perfusion Study: Normal.    2. There is no scintigraphic evidence for inducible ischemia.    3. No evidence of scarred myocardium.    4. Left ventricle is normal in size. The left ventricle systolic   function is normal.    5. Right ventricle is normal in size. The right ventricle systolic   function is normal.    6. This is a low risk scan.    7. Incidental Findings from limited non-diagnostic CTAC:       There is coronary artery calcification. Appearance of mild ectasia of   aortic root (4.1 cm). Central pulmonary artery is slightly prominent with   the right pulmonary artery measuring 2.9 cm. No pneumothorax. No pleural   effusion. There is bibasilar atelectasis. Possible calcified granuloma is   noted at the right posterior lung base.             Gated Stress FBP    LVEF % 75     Visit Vitals  BP (!) 112/96   Pulse 66   Temp 36 °C (96.8 °F) (Temporal)  "  Resp 18   Ht 1.702 m (5' 7\")   Wt 96.9 kg (213 lb 10 oz)   SpO2 96%   BMI 33.46 kg/m²   Smoking Status Never   BSA 2.14 m²     NPO/Void Status  Carbohydrate Drink Given Prior to Surgery? : N  Date of Last Liquid: 12/13/24  Time of Last Liquid: 0430  Date of Last Solid: 12/12/24  Time of Last Solid: 1830  Last Intake Type: Clear fluids  Time of Last Void: 1042        Physical Exam    Airway  Mallampati: III  TM distance: >3 FB  Neck ROM: full     Cardiovascular - normal exam  Rhythm: regular  Rate: normal     Dental    Pulmonary - normal exam     Abdominal - normal exam             Anesthesia Plan    History of general anesthesia?: yes  History of complications of general anesthesia?: no    ASA 3     MAC   (Standard ASA monitoring.)  intravenous induction   Anesthetic plan and risks discussed with patient.    Plan discussed with CRNA and CAA.        "

## 2024-12-17 LAB
LABORATORY COMMENT REPORT: NORMAL
LABORATORY COMMENT REPORT: NORMAL
PATH REPORT.COMMENTS IMP SPEC: NORMAL
PATH REPORT.FINAL DX SPEC: NORMAL
PATH REPORT.GROSS SPEC: NORMAL
PATH REPORT.INTRAOP OBS SPEC DOC: NORMAL
PATH REPORT.TOTAL CANCER: NORMAL

## 2024-12-27 ENCOUNTER — TELEPHONE (OUTPATIENT)
Dept: GASTROENTEROLOGY | Facility: HOSPITAL | Age: 74
End: 2024-12-27
Payer: MEDICARE

## 2024-12-27 NOTE — TELEPHONE ENCOUNTER
Referring office called to check on procedure follow up per Dr. Peter. Based on procedure report he stated to await pathology results, which are still pending.    Voicemail left for referring office stating that information.

## 2024-12-30 LAB
LABORATORY COMMENT REPORT: NORMAL
PATH REPORT.FINAL DX SPEC: NORMAL
PATH REPORT.GROSS SPEC: NORMAL
PATH REPORT.TOTAL CANCER: NORMAL
RESIDENT REVIEW: NORMAL

## 2024-12-31 DIAGNOSIS — K86.89 PANCREATIC MASS (HHS-HCC): Primary | ICD-10-CM

## 2025-01-16 NOTE — H&P
History Of Present Illness  Nikhil Underwood is a 74 y.o. male presenting with pancreatic mass.     Past Medical History  Past Medical History:   Diagnosis Date    Atrial fibrillation (Multi)     CKD (chronic kidney disease)     Developmental delay     Gout     HTN (hypertension)     Parkinson's disease (Multi)     Pericardial effusion ([facility]-HCC) 2020    Schizophrenia      Surgical History  Past Surgical History:   Procedure Laterality Date    COLONOSCOPY      ESOPHAGOGASTRODUODENOSCOPY      EXCISION / REPAIR HYDROCELE PEDIATRIC  2018    HEMORRHOID SURGERY      PERICARDIAL WINDOW  07/27/2020    via left VATS    PILONIDAL CYST DRAINAGE Bilateral      Social History  He reports that he has never smoked. He has never used smokeless tobacco. He reports that he does not drink alcohol and does not use drugs.    Family History  No family history on file.     Allergies  Allergies   Allergen Reactions    Lithium Unknown     Review of Systems     Physical Exam  Vitals and nursing note reviewed.   Constitutional:       Appearance: Normal appearance.   Cardiovascular:      Rate and Rhythm: Normal rate and regular rhythm.      Pulses: Normal pulses.      Heart sounds: Normal heart sounds.   Pulmonary:      Effort: Pulmonary effort is normal.      Breath sounds: Normal breath sounds.   Abdominal:      General: Abdomen is flat.      Palpations: Abdomen is soft.   Neurological:      Mental Status: He is alert.          Last Recorded Vitals  There were no vitals taken for this visit.    Assessment/Plan   Pancreatic mass    Proceed with EUS     Grover Peter MD

## 2025-01-17 ENCOUNTER — ANESTHESIA (OUTPATIENT)
Dept: GASTROENTEROLOGY | Facility: HOSPITAL | Age: 75
End: 2025-01-17
Payer: MEDICARE

## 2025-01-17 ENCOUNTER — HOSPITAL ENCOUNTER (OUTPATIENT)
Dept: GASTROENTEROLOGY | Facility: HOSPITAL | Age: 75
Discharge: HOME | End: 2025-01-17
Payer: MEDICARE

## 2025-01-17 ENCOUNTER — ANESTHESIA EVENT (OUTPATIENT)
Dept: GASTROENTEROLOGY | Facility: HOSPITAL | Age: 75
End: 2025-01-17
Payer: MEDICARE

## 2025-01-17 VITALS
HEIGHT: 67 IN | OXYGEN SATURATION: 100 % | DIASTOLIC BLOOD PRESSURE: 87 MMHG | WEIGHT: 213.63 LBS | HEART RATE: 64 BPM | TEMPERATURE: 96.8 F | BODY MASS INDEX: 33.53 KG/M2 | SYSTOLIC BLOOD PRESSURE: 139 MMHG | RESPIRATION RATE: 18 BRPM

## 2025-01-17 DIAGNOSIS — K86.89 PANCREATIC MASS (HHS-HCC): ICD-10-CM

## 2025-01-17 PROCEDURE — 7100000010 HC PHASE TWO TIME - EACH INCREMENTAL 1 MINUTE

## 2025-01-17 PROCEDURE — 2720000007 HC OR 272 NO HCPCS

## 2025-01-17 PROCEDURE — 43238 EGD US FINE NEEDLE BX/ASPIR: CPT | Performed by: INTERNAL MEDICINE

## 2025-01-17 PROCEDURE — 3700000002 HC GENERAL ANESTHESIA TIME - EACH INCREMENTAL 1 MINUTE

## 2025-01-17 PROCEDURE — 7100000009 HC PHASE TWO TIME - INITIAL BASE CHARGE

## 2025-01-17 PROCEDURE — 3700000001 HC GENERAL ANESTHESIA TIME - INITIAL BASE CHARGE

## 2025-01-17 PROCEDURE — 2500000004 HC RX 250 GENERAL PHARMACY W/ HCPCS (ALT 636 FOR OP/ED): Performed by: REGISTERED NURSE

## 2025-01-17 RX ORDER — FENTANYL CITRATE 50 UG/ML
INJECTION, SOLUTION INTRAMUSCULAR; INTRAVENOUS AS NEEDED
Status: DISCONTINUED | OUTPATIENT
Start: 2025-01-17 | End: 2025-01-17

## 2025-01-17 RX ORDER — PROPOFOL 10 MG/ML
INJECTION, EMULSION INTRAVENOUS AS NEEDED
Status: DISCONTINUED | OUTPATIENT
Start: 2025-01-17 | End: 2025-01-17

## 2025-01-17 RX ORDER — MIDAZOLAM HYDROCHLORIDE 1 MG/ML
INJECTION INTRAMUSCULAR; INTRAVENOUS AS NEEDED
Status: DISCONTINUED | OUTPATIENT
Start: 2025-01-17 | End: 2025-01-17

## 2025-01-17 RX ADMIN — PROPOFOL 200 MCG/KG/MIN: 10 INJECTION, EMULSION INTRAVENOUS at 12:44

## 2025-01-17 RX ADMIN — FENTANYL CITRATE 50 MCG: 50 INJECTION, SOLUTION INTRAMUSCULAR; INTRAVENOUS at 12:42

## 2025-01-17 RX ADMIN — PROPOFOL 60 MG: 10 INJECTION, EMULSION INTRAVENOUS at 12:44

## 2025-01-17 RX ADMIN — MIDAZOLAM HYDROCHLORIDE 2 MG: 1 INJECTION, SOLUTION INTRAMUSCULAR; INTRAVENOUS at 12:42

## 2025-01-17 ASSESSMENT — PAIN SCALES - GENERAL
PAINLEVEL_OUTOF10: 0 - NO PAIN

## 2025-01-17 ASSESSMENT — PAIN - FUNCTIONAL ASSESSMENT
PAIN_FUNCTIONAL_ASSESSMENT: 0-10
PAIN_FUNCTIONAL_ASSESSMENT: UNABLE TO SELF-REPORT

## 2025-01-17 NOTE — ANESTHESIA PREPROCEDURE EVALUATION
Patient: iNkhil Underwood    Procedure Information       Date/Time: 01/17/25 1230    Scheduled providers: Grover Peter MD; Venu Valdes MD; ALESSIA Nicholson    Procedure: ENDOSCOPIC ULTRASOUND (UPPER)    Location: [facility]            Relevant Problems   Liver   (+) Acute pancreatitis, unspecified complication status, unspecified pancreatitis type ([facility]-HCC)       Clinical information reviewed:   Tobacco  Allergies  Meds   Med Hx  Surg Hx   Fam Hx  Soc Hx        NPO Detail:  No data recorded     Physical Exam    Airway  Mallampati: II  TM distance: >3 FB  Neck ROM: limited     Cardiovascular    Dental    Pulmonary    Abdominal          Anesthesia Plan    History of general anesthesia?: yes  History of complications of general anesthesia?: no    ASA 2     MAC   (NL cardiac echo, NSR, schizophrenia)  intravenous induction   Anesthetic plan and risks discussed with patient.

## 2025-01-17 NOTE — ANESTHESIA POSTPROCEDURE EVALUATION
Patient: Nikhil Underwood    Procedure Summary       Date: 01/17/25 Room / Location: [facility]    Anesthesia Start: 1238 Anesthesia Stop:     Procedure: ENDOSCOPIC ULTRASOUND (UPPER) Diagnosis: Pancreatic mass ([facility]-HCC)    Scheduled Providers: Grover Peter MD; Venu Valdes MD; YUAN Nicholson-CRNA Responsible Provider: Venu Valdes MD    Anesthesia Type: MAC ASA Status: 2            Anesthesia Type: MAC    Vitals Value Taken Time   /90 01/17/25 1324   Temp 36.5 01/17/25 1324   Pulse 66 01/17/25 1324   Resp 18 01/17/25 1324   SpO2 98 01/17/25 1324       Anesthesia Post Evaluation    Patient location during evaluation: PACU  Patient participation: complete - patient participated  Level of consciousness: awake  Pain management: adequate  Airway patency: patent  Cardiovascular status: acceptable and hemodynamically stable  Respiratory status: acceptable and spontaneous ventilation  Hydration status: acceptable  Postoperative Nausea and Vomiting: none    No notable events documented.

## 2025-01-17 NOTE — PRE-PROCEDURE NOTE
Pt arrives to [facility] with caregiver Gabe from [facility], medications confirmed with RN by Gabe caregiver. Pt unable to assess for C-SSRS, no visible concerns at this time. Pt can state name and . Pt unsure of location or time but aware he is having a procedure today.

## 2025-01-17 NOTE — DISCHARGE INSTRUCTIONS

## 2025-01-31 ENCOUNTER — TELEPHONE (OUTPATIENT)
Dept: GASTROENTEROLOGY | Facility: HOSPITAL | Age: 75
End: 2025-01-31
Payer: MEDICARE

## 2025-01-31 LAB
LAB AP ASR DISCLAIMER: NORMAL
LAB AP ASR DISCLAIMER: NORMAL
LABORATORY COMMENT REPORT: NORMAL
PATH REPORT.COMMENTS IMP SPEC: NORMAL
PATH REPORT.COMMENTS IMP SPEC: NORMAL
PATH REPORT.FINAL DX SPEC: NORMAL
PATH REPORT.FINAL DX SPEC: NORMAL
PATH REPORT.GROSS SPEC: NORMAL
PATH REPORT.GROSS SPEC: NORMAL
PATH REPORT.INTRAOP OBS SPEC DOC: NORMAL
PATH REPORT.RELEVANT HX SPEC: NORMAL
PATH REPORT.TOTAL CANCER: NORMAL
PATH REPORT.TOTAL CANCER: NORMAL
RESIDENT REVIEW: NORMAL

## 2025-01-31 NOTE — TELEPHONE ENCOUNTER
Patient brother Avelino returned Dr. Peter's missed call regarding patient pathology results. Avelino will be home after 4:30pm today and is asking if Dr. Peter can try to call again at [phone] after 4:30pm.    I will route this to Dr. Peter.

## 2025-02-05 PROBLEM — D3A.8 NEUROENDOCRINE TUMOR OF PANCREAS (CMS-HCC): Status: ACTIVE | Noted: 2025-02-05

## 2025-02-05 NOTE — PROGRESS NOTES
Chief Complaint:  PNET   Ref = [place]    HPI:  73 yo man from [place] referred for PNET.    May 2024 brief inpt stay at   CT:  IMPRESSION:  Expansion of the distal pancreatic body and tail with hypoenhancement  and mild surrounding edema, findings may relate to pancreatitis with  necrotizing pancreatitis not excluded. Follow-up MRI is however  recommended for further evaluation and to exclude other pathology  including mass. 10 mm soft tissue nodular density between the  pancreatic tail and spleen may correspond to a lymph node.  Bilateral renal atrophy and cortical scarring and small renal cysts  and subcentimeter hypodensities too small to characterize. 7 mm  nonobstructive left renal calculus.  Prostatomegaly; please correlate with PSA.  MRI:  IMPRESSION:  1.  6 cm hypoenhancing region in the pancreatic body/tail which  appears masslike with encasement and occlusion of the splenic vein.  These imaging features can be seen with adenocarcinoma, however there  are also some imaging features which are not entirely consistent with  this. For example there appears to be delayed enhancement within this  region as well as no restricted diffusion. This could also be related  to fibrosis secondary to chronic pancreatitis. There do not appear  findings of acute pancreatitis at this time. Please note evaluation  on this exam is severely limited due to motion artifact. Consider  endoscopic ultrasound with tissue sampling for further evaluation.  2. Hepatic steatosis.  3. No definite metastatic disease of the abdomen otherwise.  EGD:  Impression  2 cm type I hiatal hernia  Multiple subcentimeter polyps  The 1st part of the duodenum and 2nd part of the duodenum appeared normal.  FINAL DIAGNOSIS   A. 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   19-9 = 17  In DC summary supposed to have EUS, etc    December 2024 EUS:  Impression  C0M1 Armas's esophagus; performed cold  forceps biopsy  Heterogeneous, irregular and oval mass measuring 40 mm x 30 mm was visualized in the body of the pancreas; 6 fine needle biopsy passes were taken. An adequate sample was obtained. A sample was sent for histology analysis. Cytology analysis was performed. Onsite cytologist was present and cytology results were preliminarily atypical  One round node was observed at the peripancreatic abdominal station with well-defined margins, measuring 10 x 8 mm. Pathological lymphadenopathy was present  The duodenum and major papilla appeared normal.  B. Pancreas, Body, Mass, Biopsy:   -- Rare epithelial-like cells. See note.  Part B, the clinical impression of a hypoenhancing region in the pancreatic body/tail is noted. The biopsy reveals epithelial-like cells, which may represent islet cells, though no definitive neoplastic tissue is identified. This biopsy may not be fully representative of the entire lesion. Clinical and radiological correlation is essential, and repeat biopsy is recommended if clinically warranted.     January 2025 EUS:  Impression  Heterogeneous and oval mass measuring 40 mm x 26 mm with well-defined margins was visualized in the body of the pancreas; 6 fine needle biopsy passes were taken. An adequate sample was obtained. A sample was sent for histology analysis. Cytology analysis was performed. Onsite cytologist was present and cytology results were preliminarily atypical  The parenchyma was visualized in the head of the pancreas and neck of the pancreas and appeared to have a normal salt and pepper appearance.  FINAL DIAGNOSIS      Well differentiated neuroendocrine tumor, grade 2: Soft tissue (identified as pancreas, body) biopsy   Electronically signed by Kika MAC Asa, MD PhD on 1/31/2025 at 1040        By the signature on this report, the individual or group listed as making the Final Interpretation/Diagnosis certifies that they have reviewed this case.    Comment    The biopsy consists  mainly of blood with a few fragments of fibrous tissue infiltrated by bland epithelial cells that express INSM1, chromogranin,and keratins (AE1/3 and CAM 5.2) as well as ARX; the tumor is negative for CDX2, gastrin, insulin, glucagon, somatostatin and pancreatic polypeptide. The Ki67 labeling index is 3.8%.      Of note, I see CT/MRI reports from [facility] as far back at 2020 that mention TOP abnl.    Mr Yasmine is unable to provide any history.  It is provided by his power of  brother.  Also present is his brother's wife as well as 2 of the nurses from the group home in which she lives.  The history really is outlined above.    PMH:  MRDD, schizo, CKD, HTN, pericard effusion, AF, Parkinson, Gout    PSH:  pilonidal, hemorrhoid, hydrocele    Soc:  Lives in group home    PE: Appears his stated age.  Obese.  Abdomen protuberant but soft and nontender.  No jaundice.    Impression / Plan:    This patient has a biopsy-proven well-differentiated pancreatic neuroendocrine tumor as noted above.  We do not have any recent staging imaging.  We need to get a pancreas protocol CT as well as a Netspot.    Today I have used pictorial aids and described in detail for this patient as well as those present what would be involved in standard treatment if he is found to have a localized well-differentiated pancreatic neuroendocrine tumor.  In his case that would be a distal pancreatectomy and splenectomy.  We talked at length about what is involved in that operation and what the goals of that operation are.  We talked at length about the potential morbidity and mortality related to that operation are.  We talked about the expected recovery in the hospital and at home.  We talked about generic postsurgical complications as well as pancreas specific postsurgical complications talking about both short-term and long-term complications.  We talked a lot about fistulas, drains, infection, bleeding, diabetes, etc.    Clearly there are some  mitigating factors in this gentlemen's history that could affect the decision making for his power of  but this gentleman otherwise does seem to be an appropriate shape for a big operation.  Again recovery may be more challenging for him.    Again we will get the cross-sectional imaging and PET scan and my team will set up a follow-up visit so that all of us can again review options based on his contemporary staging.  Depending on their thoughts we may have him see my colleague in neuroendocrine medical oncology to discuss alternative therapies.    This note has been dictated with voice recognition software and has not been reviewed for grammar or content errors.    Update 2/8:  CBC fine.  CMP nl x Cr 1.6 with GFR 44 (looks similar to priors)

## 2025-02-07 ENCOUNTER — OFFICE VISIT (OUTPATIENT)
Dept: SURGERY | Facility: CLINIC | Age: 75
End: 2025-02-07
Payer: COMMERCIAL

## 2025-02-07 VITALS
SYSTOLIC BLOOD PRESSURE: 152 MMHG | HEART RATE: 76 BPM | OXYGEN SATURATION: 97 % | TEMPERATURE: 98.2 F | BODY MASS INDEX: 35.06 KG/M2 | HEIGHT: 67 IN | DIASTOLIC BLOOD PRESSURE: 80 MMHG | WEIGHT: 223.4 LBS

## 2025-02-07 DIAGNOSIS — D3A.8 NEUROENDOCRINE TUMOR OF PANCREAS (CMS-HCC): ICD-10-CM

## 2025-02-07 DIAGNOSIS — D3A.8 NEUROENDOCRINE TUMOR OF PANCREAS (CMS-HCC): Primary | ICD-10-CM

## 2025-02-07 PROCEDURE — 1159F MED LIST DOCD IN RCRD: CPT | Performed by: SURGERY

## 2025-02-07 PROCEDURE — 99204 OFFICE O/P NEW MOD 45 MIN: CPT | Performed by: SURGERY

## 2025-02-07 PROCEDURE — 1036F TOBACCO NON-USER: CPT | Performed by: SURGERY

## 2025-02-07 PROCEDURE — 3008F BODY MASS INDEX DOCD: CPT | Performed by: SURGERY

## 2025-02-08 LAB
ALBUMIN SERPL-MCNC: 4.5 G/DL (ref 3.6–5.1)
ALP SERPL-CCNC: 85 U/L (ref 35–144)
ALT SERPL-CCNC: 22 U/L (ref 9–46)
ANION GAP SERPL CALCULATED.4IONS-SCNC: 13 MMOL/L (CALC) (ref 7–17)
AST SERPL-CCNC: 22 U/L (ref 10–35)
BILIRUB SERPL-MCNC: 0.4 MG/DL (ref 0.2–1.2)
BUN SERPL-MCNC: 24 MG/DL (ref 7–25)
CALCIUM SERPL-MCNC: 9.6 MG/DL (ref 8.6–10.3)
CHLORIDE SERPL-SCNC: 102 MMOL/L (ref 98–110)
CO2 SERPL-SCNC: 24 MMOL/L (ref 20–32)
CREAT SERPL-MCNC: 1.63 MG/DL (ref 0.7–1.28)
EGFRCR SERPLBLD CKD-EPI 2021: 44 ML/MIN/1.73M2
ERYTHROCYTE [DISTWIDTH] IN BLOOD BY AUTOMATED COUNT: 13 % (ref 11–15)
GLUCOSE SERPL-MCNC: 111 MG/DL (ref 65–139)
HCT VFR BLD AUTO: 47.5 % (ref 38.5–50)
HGB BLD-MCNC: 15.2 G/DL (ref 13.2–17.1)
MCH RBC QN AUTO: 30.3 PG (ref 27–33)
MCHC RBC AUTO-ENTMCNC: 32 G/DL (ref 32–36)
MCV RBC AUTO: 94.6 FL (ref 80–100)
PLATELET # BLD AUTO: 184 THOUSAND/UL (ref 140–400)
PMV BLD REES-ECKER: 10.5 FL (ref 7.5–12.5)
POTASSIUM SERPL-SCNC: 4.8 MMOL/L (ref 3.5–5.3)
PROT SERPL-MCNC: 7.4 G/DL (ref 6.1–8.1)
RBC # BLD AUTO: 5.02 MILLION/UL (ref 4.2–5.8)
SODIUM SERPL-SCNC: 139 MMOL/L (ref 135–146)
WBC # BLD AUTO: 6.3 THOUSAND/UL (ref 3.8–10.8)

## 2025-02-12 ENCOUNTER — APPOINTMENT (OUTPATIENT)
Dept: RADIOLOGY | Facility: HOSPITAL | Age: 75
End: 2025-02-12
Payer: MEDICARE

## 2025-02-14 ENCOUNTER — HOSPITAL ENCOUNTER (OUTPATIENT)
Dept: RADIOLOGY | Facility: HOSPITAL | Age: 75
Discharge: HOME | End: 2025-02-14
Payer: COMMERCIAL

## 2025-02-14 DIAGNOSIS — D3A.8 NEUROENDOCRINE TUMOR OF PANCREAS (CMS-HCC): ICD-10-CM

## 2025-02-14 PROCEDURE — 74177 CT ABD & PELVIS W/CONTRAST: CPT

## 2025-02-14 PROCEDURE — 2550000001 HC RX 255 CONTRASTS: Performed by: SURGERY

## 2025-02-14 RX ADMIN — IOHEXOL 75 ML: 350 INJECTION, SOLUTION INTRAVENOUS at 13:07

## 2025-02-20 ENCOUNTER — HOSPITAL ENCOUNTER (OUTPATIENT)
Dept: RADIOLOGY | Facility: HOSPITAL | Age: 75
Discharge: HOME | End: 2025-02-20
Payer: COMMERCIAL

## 2025-02-20 DIAGNOSIS — D3A.8 NEUROENDOCRINE TUMOR OF PANCREAS (CMS-HCC): ICD-10-CM

## 2025-02-20 PROCEDURE — 78815 PET IMAGE W/CT SKULL-THIGH: CPT | Mod: PI

## 2025-02-21 NOTE — PROGRESS NOTES
"Reason for visit:   FUV / Review Imaging and Finalize Plans  Ref = [place]     HPI:  Initial consultation from 2/7/25 summary:    \"This patient has a biopsy-proven well-differentiated pancreatic neuroendocrine tumor as noted above.  We do not have any recent staging imaging.  We need to get a pancreas protocol CT as well as a Netspot.     Today I have used pictorial aids and described in detail for this patient as well as those present what would be involved in standard treatment if he is found to have a localized well-differentiated pancreatic neuroendocrine tumor.  In his case that would be a distal pancreatectomy and splenectomy.  We talked at length about what is involved in that operation and what the goals of that operation are.  We talked at length about the potential morbidity and mortality related to that operation are.  We talked about the expected recovery in the hospital and at home.  We talked about generic postsurgical complications as well as pancreas specific postsurgical complications talking about both short-term and long-term complications.  We talked a lot about fistulas, drains, infection, bleeding, diabetes, etc.     Clearly there are some mitigating factors in this gentlemen's history that could affect the decision making for his power of  but this gentleman otherwise does seem to be an appropriate shape for a big operation.  Again recovery may be more challenging for him.     Again we will get the cross-sectional imaging and PET scan and my team will set up a follow-up visit so that all of us can again review options based on his contemporary staging.  Depending on their thoughts we may have him see my colleague in neuroendocrine medical oncology to discuss alternative therapies.     This note has been dictated with voice recognition software and has not been reviewed for grammar or content errors.     Update 2/8:  CBC fine.  CMP nl x Cr 1.6 with GFR 44 (looks similar to " "priors)\"    -----    PPCT 2/17/25  IMPRESSION:  1.  Findings favoring progression of poorly delineated elongated  progressively enhancing masslike observation involving the pancreas  with contiguous extension involving the adjacent stomach and left  adrenal gland, encasement of the splenic artery in likely occlusion  of the splenic vein with collaterals including intragastric venous  collateral.  2. Multiple subtle pancreatic progressively enhancing nodules in the  setting of background steatosis the potential cirrhosis. The pattern  is suspicious for metastases although nonspecific. Correlation with  Eovist MRI and/or tissue sampling recommended.  (I notified rads that this should be liver/hepatic, and he will make addendum.)  3. Increased retroperitoneal lymph nodes, likely metastatic.     NETSPOT 2/20/25  IMPRESSION:  1.  Somatostatin receptor expressing mass within the pancreatic body  compatible with biopsy-proven neuroendocrine tumor.  2. Somatostatin receptor expressing metastatic disease within the  abdomen including multiple liver lesions, a left adrenal gland, a  splenic hilar lesion, and an omental nodule as described above.  3. Enlarged and abnormally rounded left para-aortic lymph nodes  demonstrating intense somatostatin receptor expression compatible  with alvaro disease.    PE:    Impression / Plan:  " medical oncology, Marcela Shrestha.      Even if this gentleman's imaging had suggested localized disease considering an operation on this patient is worth a lot of discussion.  It is certainly possible and doable but there are some unique circumstances that could make his recovery challenging.  Again they are not a reason to approach him as any other patient but they are worth noting.  They may have an impact on treatment decisions given his extent of disease.  All at the office visit today are on the same page about the nuances of care for this gentleman.    I did let the family know that treatment options could include traditional chemotherapy, hormonal therapy, or specialized radiation type therapy.  They look forward to hearing about the nonsurgical treatment options.    This note has been dictated with voice recognition software and has not been reviewed for grammar or content errors.

## 2025-02-26 ENCOUNTER — APPOINTMENT (OUTPATIENT)
Dept: SURGERY | Facility: CLINIC | Age: 75
End: 2025-02-26
Payer: COMMERCIAL

## 2025-02-26 VITALS
DIASTOLIC BLOOD PRESSURE: 74 MMHG | BODY MASS INDEX: 34.56 KG/M2 | HEIGHT: 67 IN | WEIGHT: 220.2 LBS | SYSTOLIC BLOOD PRESSURE: 139 MMHG | OXYGEN SATURATION: 97 % | HEART RATE: 73 BPM | TEMPERATURE: 97.7 F

## 2025-02-26 DIAGNOSIS — D3A.8 NEUROENDOCRINE TUMOR OF PANCREAS (CMS-HCC): Primary | ICD-10-CM

## 2025-02-26 PROCEDURE — 3008F BODY MASS INDEX DOCD: CPT | Performed by: SURGERY

## 2025-02-26 PROCEDURE — 99213 OFFICE O/P EST LOW 20 MIN: CPT | Performed by: SURGERY

## 2025-02-26 PROCEDURE — 1159F MED LIST DOCD IN RCRD: CPT | Performed by: SURGERY

## 2025-02-26 PROCEDURE — 1036F TOBACCO NON-USER: CPT | Performed by: SURGERY

## 2025-03-26 ENCOUNTER — OFFICE VISIT (OUTPATIENT)
Dept: HEMATOLOGY/ONCOLOGY | Facility: HOSPITAL | Age: 75
End: 2025-03-26
Payer: COMMERCIAL

## 2025-03-26 VITALS
RESPIRATION RATE: 18 BRPM | TEMPERATURE: 97 F | HEART RATE: 73 BPM | DIASTOLIC BLOOD PRESSURE: 69 MMHG | SYSTOLIC BLOOD PRESSURE: 143 MMHG | OXYGEN SATURATION: 99 %

## 2025-03-26 DIAGNOSIS — D3A.8 NEUROENDOCRINE TUMOR OF PANCREAS: Primary | ICD-10-CM

## 2025-03-26 PROCEDURE — 1126F AMNT PAIN NOTED NONE PRSNT: CPT | Performed by: INTERNAL MEDICINE

## 2025-03-26 PROCEDURE — 1036F TOBACCO NON-USER: CPT | Performed by: INTERNAL MEDICINE

## 2025-03-26 PROCEDURE — 99215 OFFICE O/P EST HI 40 MIN: CPT | Performed by: INTERNAL MEDICINE

## 2025-03-26 PROCEDURE — 99205 OFFICE O/P NEW HI 60 MIN: CPT | Performed by: INTERNAL MEDICINE

## 2025-03-26 RX ORDER — EPINEPHRINE 0.3 MG/.3ML
0.3 INJECTION SUBCUTANEOUS EVERY 5 MIN PRN
OUTPATIENT
Start: 2025-04-09

## 2025-03-26 RX ORDER — DIPHENHYDRAMINE HYDROCHLORIDE 50 MG/ML
50 INJECTION, SOLUTION INTRAMUSCULAR; INTRAVENOUS AS NEEDED
OUTPATIENT
Start: 2025-04-09

## 2025-03-26 RX ORDER — LANREOTIDE ACETATE 120 MG/.5ML
120 INJECTION SUBCUTANEOUS ONCE
OUTPATIENT
Start: 2025-04-09

## 2025-03-26 RX ORDER — ALBUTEROL SULFATE 0.83 MG/ML
3 SOLUTION RESPIRATORY (INHALATION) AS NEEDED
OUTPATIENT
Start: 2025-04-09

## 2025-03-26 RX ORDER — FAMOTIDINE 10 MG/ML
20 INJECTION, SOLUTION INTRAVENOUS ONCE AS NEEDED
OUTPATIENT
Start: 2025-04-09

## 2025-03-26 ASSESSMENT — PAIN SCALES - GENERAL: PAINLEVEL_OUTOF10: 0-NO PAIN

## 2025-03-26 NOTE — PROGRESS NOTES
Pt is here for a new patient visit with Dr. Shrestha. Medications, pharmacy preference and allergies were reviewed with patient and updated in the medical record by MD.  Disease binder was given to pt. Lanreotide handouts was given. Instructed pt to call with any questions or concerns. Patient verbalized understanding and agreement regarding discussed information/plan. Pt escorted to scheduling.

## 2025-03-26 NOTE — PROGRESS NOTES
.Patient ID: Nikhil Underwood is a 75 y.o. male.  Referring Physician: Jose Murillo MD  [id number]  Department of [place] [address]  Primary Care Provider: Linda Boyd APRN-CNP      Chief complaint: Metastatic well diff. PanNET    HPI  74 y.o. male presenting with pancreatic body mass.   05/08/2024: CT AP Expansion of the distal pancreatic body and tail with hypoenhancement and mild surrounding edema  05/09/2024: MRCP showed 6 cm hypoenhancing region in the pancreatic body/tail   01/17/2025: EUS w biopsy showed G2 well diff. NET with Ki67 3.8%  02/20/2025: PET-Ga68 showed Somatostatin receptor expressing mass within the pancreatic body , metastatic disease within the abdomen including multiple liver lesions, a left adrenal gland, a splenic hilar lesion, and an omental nodule, and left para-aortic lymph nodes.  Today: Has his family and caregiver with him. No abdominal pain,no nausea or vomiting. No change in BMs    SYSTEMIC TREATMENT RECEIVED       Subjective   Please refer to Notes above for complete History of present illness.          Review of Systems:   All 14 systems have been reviewed and were negative except for the HPI.       MEDICAL HISTORY  Past Medical History:   Diagnosis Date    Atrial fibrillation (Multi)     CKD (chronic kidney disease)     Developmental delay     Gout     HTN (hypertension)     Parkinson's disease (Multi)     Pericardial effusion ([facility]) 2020    Schizophrenia        FAMILY HISTORY  No family history on file.    TOBACCO HISTORY  Tobacco Use: Low Risk  (2/26/2025)    Patient History     Smoking Tobacco Use: Never     Smokeless Tobacco Use: Never     Passive Exposure: Not on file       SOCIAL HISTORY  Social Connections: Not on File (9/12/2024)    Received from [facility]    Social Connections     Connectedness: 0        Outpatient Medication Profile:  Current Outpatient Medications on File Prior to Visit   Medication Sig Dispense Refill     acetaminophen (Tylenol) 325 mg capsule Take 1 capsule (325 mg) by mouth every 6 hours if needed for mild pain (1 - 3) or headaches. (Patient not taking: Reported on 1/17/2025)      allopurinol (Zyloprim) 100 mg tablet Take 1 tablet (100 mg) by mouth once daily. (Patient not taking: Reported on 1/17/2025)      apixaban (Eliquis) 5 mg tablet Take 1 tablet (5 mg) by mouth 2 times a day. Last taken on 1/13      carbidopa-levodopa (Sinemet)  mg tablet Take 1.5 tablets by mouth 3 times a day.      cariprazine (Vraylar) 6 mg capsule Take 1 capsule (6 mg) by mouth once daily.      clonazePAM (KlonoPIN) 0.5 mg tablet Take 1 tablet (0.5 mg) by mouth 3 times a day.      ergocalciferol (Vitamin D-2) 200 mcg/mL (8,000 unit/mL) drops Take 50,000 Units by mouth 1 (one) time per week.      ezetimibe (Zetia) 10 mg tablet Take 1 tablet (10 mg) by mouth once daily.      famotidine (Pepcid) 20 mg tablet Take 1 tablet (20 mg) by mouth 2 times a day. 60 tablet 0    fluticasone (Flonase) 50 mcg/actuation nasal spray Administer 2 sprays into each nostril once daily.      furosemide (Lasix) 20 mg tablet Take 1 tablet (20 mg) by mouth once daily.      guaiFENesin (Mucinex) 600 mg 12 hr tablet Take 1 tablet (600 mg) by mouth every 12 hours if needed for congestion. (Patient not taking: Reported on 1/17/2025)      LACTOBACILLUS ACIDOPHILUS ORAL Take 2 capsules by mouth twice a day.      losartan (Cozaar) 50 mg tablet Take 1 tablet (50 mg) by mouth once daily.      metFORMIN (Glucophage) 500 mg tablet Take 1 tablet (500 mg) by mouth once daily in the evening. Take with meals.      metoprolol tartrate (Lopressor) 50 mg tablet Take 1 tablet by mouth twice a day.      OLANZapine (ZyPREXA) 5 mg tablet Take 1 tablet (5 mg) by mouth once daily in the morning.      OLANZapine zydis (ZyPREXA) 5 mg disintegrating tablet Dissolve 1 tablet (5 mg) in the mouth once daily as needed (Anxiety). (Patient not taking: Reported on 1/17/2025)      potassium  chloride CR 10 mEq ER tablet Take 1 tablet (10 mEq) by mouth once daily.      pravastatin (Pravachol) 40 mg tablet Take 1 tablet (40 mg) by mouth once daily at bedtime.      psyllium husk (MetamuciL) 3.4 gram/5.4 gram powder Take 1 tsp by mouth once daily.      QUEtiapine (SEROquel) 400 mg tablet Take 1 tablet (400 mg) by mouth once daily at bedtime.      tamsulosin (Flomax) 0.4 mg 24 hr capsule Take 1 capsule (0.4 mg) by mouth once daily at bedtime.      valproate (Depakene) 250 mg/5 mL oral solution Take 10 mL (500 mg) by mouth once daily in the morning.      valproate (Depakene) 250 mg/5 mL oral solution Take 20 mL (1,000 mg) by mouth once daily at bedtime.       No current facility-administered medications on file prior to visit.         Performance Status:  Asymptomatic     Vitals and Measurements:   There were no vitals taken for this visit.      Physical Exam:   General: Appears well and in NAD  Eyes: PERRL, EOMI, clear sclera  ENMT: mucous membranes moist, no apparent injury, no lesions seen  Head/Neck: Neck supple, no apparent injury, thyroid without mass or tenderness, No JVD, trachea midline,   Thorax: Patent airways, CTAB, normal breath sounds with good chest expansion, thorax symmetric  Cardiovascular: Regular, rate and rhythm, no murmurs, 2+ equal pulses of the extremities, normal S 1and S 2  Gastrointestinal: Nondistended, soft, non-tender, no rebound tenderness or guarding, no masses palpable, no organomegaly, +BS  Musculoskeletal: ROM intact, no joint swelling, normal strength  Extremities: normal extremities, no cyanosis edema, contusions or wounds, no clubbing  Neurological: alert and oriented x3, intact senses, motor, response and reflexes, normal strength  Lymphatic: No significant lymphadenopathy  Psychological: Appropriate mood and behavior  Skin: Warm and dry, no lesions, no rashes         Lab Results:  I have reviewed these laboratory results:     Lab Results   Component Value Date    WBC  "6.3 02/07/2025    HGB 15.2 02/07/2025    HCT 47.5 02/07/2025    MCV 94.6 02/07/2025     02/07/2025         Chemistry    Lab Results   Component Value Date/Time     02/07/2025 1606    K 4.8 02/07/2025 1606     02/07/2025 1606    CO2 24 02/07/2025 1606    BUN 24 02/07/2025 1606    CREATININE 1.63 (H) 02/07/2025 1606    Lab Results   Component Value Date/Time    CALCIUM 9.6 02/07/2025 1606    ALKPHOS 85 02/07/2025 1606    AST 22 02/07/2025 1606    ALT 22 02/07/2025 1606    BILITOT 0.4 02/07/2025 1606            Lab Results   Component Value Date    TSH 2.38 08/28/2020        Radiology Result:  I have reviewed the latest Imaging in PACS and the findings are noted in this note. I discussed the results of the latest imaging with the patient. All previous imaging were reviewed at the time it was completed. Full records are available in the EMR for review as well.     === 02/14/25 ===    CT PANCREAS PRE OP EVALUATION WITH CONTRAST    Addendum 2/17/2025  2:28 PM ------------------------------------------------  Interpreted By:  Marquez Howell,  ADDENDUM:  A report error has come to my attention. The 2nd impression should  read \"2. Multiple subtle progressively enhancing liver nodules in the  setting of background steatosis and potential cirrhosis. The pattern  is suspicious for metastasis although nonspecific. Correlation with  Eovist MRI and/or tissue sampling recommended.\"    Signed by: Marquez Howell 2/17/2025 2:28 PM    -------- ORIGINAL REPORT --------  Dictation workstation:   [id number]    - Impression -  1.  Findings favoring progression of poorly delineated elongated  progressively enhancing masslike observation involving the pancreas  with contiguous extension involving the adjacent stomach and left  adrenal gland, encasement of the splenic artery in likely occlusion  of the splenic vein with collaterals including intragastric venous  collateral.  2. Multiple subtle pancreatic progressively " enhancing nodules in the  setting of background steatosis the potential cirrhosis. The pattern  is suspicious for metastases although nonspecific. Correlation with  Eovist MRI and/or tissue sampling recommended.  3. Increased retroperitoneal lymph nodes, likely metastatic.    Signed by: Marquez Howell 2/17/2025 11:36 AM  Dictation workstation:   [id number]    === 05/08/24 ===    MR MRCP WITH PANCREAS WO AND W CONTRAST    - Impression -  1.  6 cm hypoenhancing region in the pancreatic body/tail which  appears masslike with encasement and occlusion of the splenic vein.  These imaging features can be seen with adenocarcinoma, however there  are also some imaging features which are not entirely consistent with  this. For example there appears to be delayed enhancement within this  region as well as no restricted diffusion. This could also be related  to fibrosis secondary to chronic pancreatitis. There do not appear  findings of acute pancreatitis at this time. Please note evaluation  on this exam is severely limited due to motion artifact. Consider  endoscopic ultrasound with tissue sampling for further evaluation.  2. Hepatic steatosis.  3. No definite metastatic disease of the abdomen otherwise.      MACRO:  None    Signed by: Shorty Peace 5/10/2024 9:37 AM  Dictation workstation:   [id number]           Pathology Results:  I have reviewed the full pathology report recorded in the EMR. The pertinent portions indicating diagnosis are listed here in the note. for details please refer to the full report recorded in the EMR.    Assessment and Plan:     Metastatic well diff. PanNET  74 y.o. male presenting with pancreatic body mass.   05/08/2024: CT AP Expansion of the distal pancreatic body and tail with hypoenhancement and mild surrounding edema  05/09/2024: MRCP showed 6 cm hypoenhancing region in the pancreatic body/tail   01/17/2025: EUS w biopsy showed G2 well diff. NET with Ki67 3.8%  02/20/2025: PET-Ga68 showed  Somatostatin receptor expressing mass within the pancreatic body , metastatic disease within the abdomen including multiple liver lesions, a left adrenal gland, a splenic hilar lesion, and an omental nodule, and left para-aortic lymph nodes.  Today: No abdominal pain,no nausea or vomiting. No change in BMs    Plan:  - CapTem is not a good option given his development delay, and we can start SSA   - Baseline MRI AP  - RTC in 2 months for follow up          DISCLAIMER:   In preparing for this visit and writing this note, I reviewed all the previous electronic medical records (labs, imaging and medical charts) of the patient available in the physician portal. Significant findings which helped in decision making are recorded  in this chart.     The plan was discussed with the patient. We gave him ample opportunities to ask questions. All questions were answered to his satisfaction and he verbalized understanding.       Marcela Shrestha M.D.   and Director of the Neuroendocrine Tumor Program  Gastrointestinal Medical Oncology  Department of Hematology and Oncology  [facility] [[email or web address]], [place] [address]  Phone (Office): [phone]  Fax:  [phone]   Email: [email or web address]  Learn more about [facility] [[email or web address]] Comprehensive Neuroendocrine Tumor Program: Click Here  Learn more about [place] [employer]: [email or web address]

## 2025-04-01 ENCOUNTER — APPOINTMENT (OUTPATIENT)
Dept: RADIOLOGY | Facility: CLINIC | Age: 75
End: 2025-04-01
Payer: COMMERCIAL

## 2025-04-03 ENCOUNTER — HOSPITAL ENCOUNTER (OUTPATIENT)
Dept: RADIOLOGY | Facility: CLINIC | Age: 75
Discharge: HOME | End: 2025-04-03
Payer: COMMERCIAL

## 2025-04-03 DIAGNOSIS — D3A.8 NEUROENDOCRINE TUMOR OF PANCREAS: ICD-10-CM

## 2025-04-03 PROCEDURE — A9575 INJ GADOTERATE MEGLUMI 0.1ML: HCPCS | Performed by: INTERNAL MEDICINE

## 2025-04-03 PROCEDURE — 2550000001 HC RX 255 CONTRASTS: Performed by: INTERNAL MEDICINE

## 2025-04-03 PROCEDURE — 74183 MRI ABD W/O CNTR FLWD CNTR: CPT

## 2025-04-03 RX ORDER — GADOTERATE MEGLUMINE 376.9 MG/ML
20 INJECTION INTRAVENOUS
Status: COMPLETED | OUTPATIENT
Start: 2025-04-03 | End: 2025-04-03

## 2025-04-03 RX ADMIN — GADOTERATE MEGLUMINE 20 ML: 376.9 INJECTION INTRAVENOUS at 15:16

## 2025-04-06 ENCOUNTER — HOSPITAL ENCOUNTER (OUTPATIENT)
Dept: RADIOLOGY | Facility: HOSPITAL | Age: 75
Discharge: HOME | End: 2025-04-06
Payer: COMMERCIAL

## 2025-04-06 DIAGNOSIS — D3A.8 NEUROENDOCRINE TUMOR OF PANCREAS: ICD-10-CM

## 2025-04-06 PROCEDURE — A9575 INJ GADOTERATE MEGLUMI 0.1ML: HCPCS | Performed by: INTERNAL MEDICINE

## 2025-04-06 PROCEDURE — 2550000001 HC RX 255 CONTRASTS: Performed by: INTERNAL MEDICINE

## 2025-04-06 PROCEDURE — 72197 MRI PELVIS W/O & W/DYE: CPT

## 2025-04-06 PROCEDURE — 72197 MRI PELVIS W/O & W/DYE: CPT | Performed by: STUDENT IN AN ORGANIZED HEALTH CARE EDUCATION/TRAINING PROGRAM

## 2025-04-06 RX ORDER — GADOTERATE MEGLUMINE 376.9 MG/ML
21 INJECTION INTRAVENOUS
Status: COMPLETED | OUTPATIENT
Start: 2025-04-06 | End: 2025-04-06

## 2025-04-06 RX ADMIN — GADOTERATE MEGLUMINE 21 ML: 376.9 INJECTION INTRAVENOUS at 14:51

## 2025-04-09 ENCOUNTER — INFUSION (OUTPATIENT)
Dept: HEMATOLOGY/ONCOLOGY | Facility: CLINIC | Age: 75
End: 2025-04-09
Payer: COMMERCIAL

## 2025-04-09 ENCOUNTER — APPOINTMENT (OUTPATIENT)
Dept: HEMATOLOGY/ONCOLOGY | Facility: HOSPITAL | Age: 75
End: 2025-04-09
Payer: COMMERCIAL

## 2025-04-09 VITALS
DIASTOLIC BLOOD PRESSURE: 81 MMHG | TEMPERATURE: 97.5 F | SYSTOLIC BLOOD PRESSURE: 146 MMHG | BODY MASS INDEX: 33.78 KG/M2 | HEART RATE: 80 BPM | WEIGHT: 222.88 LBS | HEIGHT: 68 IN | OXYGEN SATURATION: 97 % | RESPIRATION RATE: 16 BRPM

## 2025-04-09 DIAGNOSIS — D3A.8 NEUROENDOCRINE TUMOR OF PANCREAS: ICD-10-CM

## 2025-04-09 LAB
ALBUMIN SERPL BCP-MCNC: 4.3 G/DL (ref 3.4–5)
ALP SERPL-CCNC: 80 U/L (ref 33–136)
ALT SERPL W P-5'-P-CCNC: 5 U/L (ref 10–52)
ANION GAP SERPL CALC-SCNC: 12 MMOL/L (ref 10–20)
AST SERPL W P-5'-P-CCNC: 17 U/L (ref 9–39)
BASOPHILS # BLD AUTO: 0.01 X10*3/UL (ref 0–0.1)
BASOPHILS NFR BLD AUTO: 0.2 %
BILIRUB SERPL-MCNC: 0.5 MG/DL (ref 0–1.2)
BUN SERPL-MCNC: 21 MG/DL (ref 6–23)
CALCIUM SERPL-MCNC: 9.6 MG/DL (ref 8.6–10.3)
CHLORIDE SERPL-SCNC: 101 MMOL/L (ref 98–107)
CO2 SERPL-SCNC: 27 MMOL/L (ref 21–32)
CREAT SERPL-MCNC: 1.64 MG/DL (ref 0.5–1.3)
EGFRCR SERPLBLD CKD-EPI 2021: 44 ML/MIN/1.73M*2
EOSINOPHIL # BLD AUTO: 0.22 X10*3/UL (ref 0–0.4)
EOSINOPHIL NFR BLD AUTO: 3.4 %
ERYTHROCYTE [DISTWIDTH] IN BLOOD BY AUTOMATED COUNT: 13 % (ref 11.5–14.5)
GLUCOSE SERPL-MCNC: 188 MG/DL (ref 74–99)
HCT VFR BLD AUTO: 47.4 % (ref 41–52)
HGB BLD-MCNC: 14.9 G/DL (ref 13.5–17.5)
IMM GRANULOCYTES # BLD AUTO: 0.01 X10*3/UL (ref 0–0.5)
IMM GRANULOCYTES NFR BLD AUTO: 0.2 % (ref 0–0.9)
LYMPHOCYTES # BLD AUTO: 0.76 X10*3/UL (ref 0.8–3)
LYMPHOCYTES NFR BLD AUTO: 11.8 %
MCH RBC QN AUTO: 29.9 PG (ref 26–34)
MCHC RBC AUTO-ENTMCNC: 31.4 G/DL (ref 32–36)
MCV RBC AUTO: 95 FL (ref 80–100)
MONOCYTES # BLD AUTO: 0.35 X10*3/UL (ref 0.05–0.8)
MONOCYTES NFR BLD AUTO: 5.4 %
NEUTROPHILS # BLD AUTO: 5.08 X10*3/UL (ref 1.6–5.5)
NEUTROPHILS NFR BLD AUTO: 79 %
PLATELET # BLD AUTO: 196 X10*3/UL (ref 150–450)
POTASSIUM SERPL-SCNC: 4.1 MMOL/L (ref 3.5–5.3)
PROT SERPL-MCNC: 7.5 G/DL (ref 6.4–8.2)
RBC # BLD AUTO: 4.98 X10*6/UL (ref 4.5–5.9)
SODIUM SERPL-SCNC: 136 MMOL/L (ref 136–145)
WBC # BLD AUTO: 6.4 X10*3/UL (ref 4.4–11.3)

## 2025-04-09 PROCEDURE — 84075 ASSAY ALKALINE PHOSPHATASE: CPT

## 2025-04-09 PROCEDURE — 2500000004 HC RX 250 GENERAL PHARMACY W/ HCPCS (ALT 636 FOR OP/ED): Mod: JZ,TB | Performed by: INTERNAL MEDICINE

## 2025-04-09 PROCEDURE — 36415 COLL VENOUS BLD VENIPUNCTURE: CPT

## 2025-04-09 PROCEDURE — 85025 COMPLETE CBC W/AUTO DIFF WBC: CPT

## 2025-04-09 PROCEDURE — 96372 THER/PROPH/DIAG INJ SC/IM: CPT

## 2025-04-09 RX ORDER — FAMOTIDINE 10 MG/ML
20 INJECTION, SOLUTION INTRAVENOUS ONCE AS NEEDED
Status: DISCONTINUED | OUTPATIENT
Start: 2025-04-09 | End: 2025-04-09 | Stop reason: HOSPADM

## 2025-04-09 RX ORDER — DIPHENHYDRAMINE HYDROCHLORIDE 50 MG/ML
50 INJECTION, SOLUTION INTRAMUSCULAR; INTRAVENOUS AS NEEDED
OUTPATIENT
Start: 2025-05-07

## 2025-04-09 RX ORDER — FAMOTIDINE 10 MG/ML
20 INJECTION, SOLUTION INTRAVENOUS ONCE AS NEEDED
OUTPATIENT
Start: 2025-05-07

## 2025-04-09 RX ORDER — ALBUTEROL SULFATE 0.83 MG/ML
3 SOLUTION RESPIRATORY (INHALATION) AS NEEDED
Status: DISCONTINUED | OUTPATIENT
Start: 2025-04-09 | End: 2025-04-09 | Stop reason: HOSPADM

## 2025-04-09 RX ORDER — EPINEPHRINE 0.3 MG/.3ML
0.3 INJECTION SUBCUTANEOUS EVERY 5 MIN PRN
OUTPATIENT
Start: 2025-05-07

## 2025-04-09 RX ORDER — ALBUTEROL SULFATE 0.83 MG/ML
3 SOLUTION RESPIRATORY (INHALATION) AS NEEDED
OUTPATIENT
Start: 2025-05-07

## 2025-04-09 RX ORDER — DIPHENHYDRAMINE HYDROCHLORIDE 50 MG/ML
50 INJECTION, SOLUTION INTRAMUSCULAR; INTRAVENOUS AS NEEDED
Status: DISCONTINUED | OUTPATIENT
Start: 2025-04-09 | End: 2025-04-09 | Stop reason: HOSPADM

## 2025-04-09 RX ORDER — EPINEPHRINE 0.3 MG/.3ML
0.3 INJECTION SUBCUTANEOUS EVERY 5 MIN PRN
Status: DISCONTINUED | OUTPATIENT
Start: 2025-04-09 | End: 2025-04-09 | Stop reason: HOSPADM

## 2025-04-09 RX ORDER — LANREOTIDE ACETATE 120 MG/.5ML
120 INJECTION SUBCUTANEOUS ONCE
Status: COMPLETED | OUTPATIENT
Start: 2025-04-09 | End: 2025-04-09

## 2025-04-09 RX ORDER — LANREOTIDE ACETATE 120 MG/.5ML
120 INJECTION SUBCUTANEOUS ONCE
OUTPATIENT
Start: 2025-05-07

## 2025-04-09 RX ADMIN — LANREOTIDE ACETATE 120 MG: 120 INJECTION SUBCUTANEOUS at 10:36

## 2025-04-09 ASSESSMENT — PAIN SCALES - GENERAL: PAINLEVEL_OUTOF10: 0-NO PAIN

## 2025-04-09 NOTE — PROGRESS NOTES
Nikhil Underwood self ambulated with caregiver to [facility] for lanreotide .  Pt tolerated treatment without incident.  Gait steady upon DC home.  Nikhil Underwood and caregiver denies further questions/concerns/comments and agrees to POC going forward.  AVS was given upon DC.

## 2025-05-07 ENCOUNTER — APPOINTMENT (OUTPATIENT)
Dept: HEMATOLOGY/ONCOLOGY | Facility: HOSPITAL | Age: 75
End: 2025-05-07
Payer: COMMERCIAL

## 2025-05-07 ENCOUNTER — INFUSION (OUTPATIENT)
Dept: HEMATOLOGY/ONCOLOGY | Facility: CLINIC | Age: 75
End: 2025-05-07
Payer: COMMERCIAL

## 2025-05-07 VITALS
HEART RATE: 75 BPM | TEMPERATURE: 97.7 F | BODY MASS INDEX: 33.99 KG/M2 | RESPIRATION RATE: 17 BRPM | WEIGHT: 221.67 LBS | DIASTOLIC BLOOD PRESSURE: 73 MMHG | SYSTOLIC BLOOD PRESSURE: 151 MMHG | OXYGEN SATURATION: 96 %

## 2025-05-07 DIAGNOSIS — D3A.8 NEUROENDOCRINE TUMOR OF PANCREAS: ICD-10-CM

## 2025-05-07 PROCEDURE — 2500000004 HC RX 250 GENERAL PHARMACY W/ HCPCS (ALT 636 FOR OP/ED): Mod: JZ,TB | Performed by: INTERNAL MEDICINE

## 2025-05-07 PROCEDURE — 96372 THER/PROPH/DIAG INJ SC/IM: CPT

## 2025-05-07 RX ORDER — EPINEPHRINE 0.3 MG/.3ML
0.3 INJECTION SUBCUTANEOUS EVERY 5 MIN PRN
Status: DISCONTINUED | OUTPATIENT
Start: 2025-05-07 | End: 2025-05-07 | Stop reason: HOSPADM

## 2025-05-07 RX ORDER — FAMOTIDINE 10 MG/ML
20 INJECTION, SOLUTION INTRAVENOUS ONCE AS NEEDED
Status: DISCONTINUED | OUTPATIENT
Start: 2025-05-07 | End: 2025-05-07 | Stop reason: HOSPADM

## 2025-05-07 RX ORDER — DIPHENHYDRAMINE HYDROCHLORIDE 50 MG/ML
50 INJECTION, SOLUTION INTRAMUSCULAR; INTRAVENOUS AS NEEDED
Status: DISCONTINUED | OUTPATIENT
Start: 2025-05-07 | End: 2025-05-07 | Stop reason: HOSPADM

## 2025-05-07 RX ORDER — EPINEPHRINE 0.3 MG/.3ML
0.3 INJECTION SUBCUTANEOUS EVERY 5 MIN PRN
OUTPATIENT
Start: 2025-06-04

## 2025-05-07 RX ORDER — DIPHENHYDRAMINE HYDROCHLORIDE 50 MG/ML
50 INJECTION, SOLUTION INTRAMUSCULAR; INTRAVENOUS AS NEEDED
OUTPATIENT
Start: 2025-06-04

## 2025-05-07 RX ORDER — ALBUTEROL SULFATE 0.83 MG/ML
3 SOLUTION RESPIRATORY (INHALATION) AS NEEDED
OUTPATIENT
Start: 2025-06-04

## 2025-05-07 RX ORDER — FAMOTIDINE 10 MG/ML
20 INJECTION, SOLUTION INTRAVENOUS ONCE AS NEEDED
OUTPATIENT
Start: 2025-06-04

## 2025-05-07 RX ORDER — LANREOTIDE ACETATE 120 MG/.5ML
120 INJECTION SUBCUTANEOUS ONCE
Status: COMPLETED | OUTPATIENT
Start: 2025-05-07 | End: 2025-05-07

## 2025-05-07 RX ORDER — LANREOTIDE ACETATE 120 MG/.5ML
120 INJECTION SUBCUTANEOUS ONCE
OUTPATIENT
Start: 2025-06-04

## 2025-05-07 RX ORDER — ALBUTEROL SULFATE 0.83 MG/ML
3 SOLUTION RESPIRATORY (INHALATION) AS NEEDED
Status: DISCONTINUED | OUTPATIENT
Start: 2025-05-07 | End: 2025-05-07 | Stop reason: HOSPADM

## 2025-05-07 RX ADMIN — LANREOTIDE ACETATE 120 MG: 120 INJECTION SUBCUTANEOUS at 10:02

## 2025-05-07 ASSESSMENT — PAIN SCALES - GENERAL: PAINLEVEL_OUTOF10: 0-NO PAIN

## 2025-05-07 NOTE — PROGRESS NOTES
Patient arrived with caregiver for Lanreotide injection, tolerated treatment without incident. Reviewed treatment schedule with patient's caregiver who verbalized understanding and denied any questions or concerns.

## 2025-05-21 ENCOUNTER — APPOINTMENT (OUTPATIENT)
Dept: HEMATOLOGY/ONCOLOGY | Facility: HOSPITAL | Age: 75
End: 2025-05-21
Payer: COMMERCIAL

## 2025-05-28 ENCOUNTER — OFFICE VISIT (OUTPATIENT)
Dept: HEMATOLOGY/ONCOLOGY | Facility: HOSPITAL | Age: 75
End: 2025-05-28
Payer: COMMERCIAL

## 2025-05-28 VITALS
SYSTOLIC BLOOD PRESSURE: 120 MMHG | RESPIRATION RATE: 18 BRPM | TEMPERATURE: 97.7 F | HEART RATE: 76 BPM | WEIGHT: 216.93 LBS | DIASTOLIC BLOOD PRESSURE: 77 MMHG | OXYGEN SATURATION: 95 % | BODY MASS INDEX: 33.26 KG/M2

## 2025-05-28 DIAGNOSIS — D3A.8 NEUROENDOCRINE TUMOR OF PANCREAS: ICD-10-CM

## 2025-05-28 PROCEDURE — 99215 OFFICE O/P EST HI 40 MIN: CPT | Performed by: INTERNAL MEDICINE

## 2025-05-28 PROCEDURE — 1036F TOBACCO NON-USER: CPT | Performed by: INTERNAL MEDICINE

## 2025-05-28 PROCEDURE — 1126F AMNT PAIN NOTED NONE PRSNT: CPT | Performed by: INTERNAL MEDICINE

## 2025-05-28 ASSESSMENT — PAIN SCALES - GENERAL: PAINLEVEL_OUTOF10: 0-NO PAIN

## 2025-05-28 NOTE — PROGRESS NOTES
.Patient ID: Nikhil Underwood is a 75 y.o. male.  Referring Physician: Marcela Shrestha MD  [id number] [place] [address]  Primary Care Provider: YUAN Millan-MURIEL      Chief complaint: Metastatic well diff. PanNET    HPI  75 y.o. male presenting with pancreatic body mass.   05/08/2024: CT AP Expansion of the distal pancreatic body and tail with hypoenhancement and mild surrounding edema  05/09/2024: MRCP showed 6 cm hypoenhancing region in the pancreatic body/tail   01/17/2025: EUS w biopsy showed G2 well diff. NET with Ki67 3.8%  02/20/2025: PET-Ga68 showed Somatostatin receptor expressing mass within the pancreatic body , metastatic disease within the abdomen including multiple liver lesions, a left adrenal gland, a splenic hilar lesion, and an omental nodule, and left para-aortic lymph nodes.  Today: Has his family and caregiver with him. No abdominal pain,no nausea or vomiting. No change in BMs    SYSTEMIC TREATMENT RECEIVED     Subjective   Please refer to Notes above for complete History of present illness.     Review of Systems:   All 14 systems have been reviewed and were negative except for the HPI.       MEDICAL HISTORY  Past Medical History:   Diagnosis Date    Atrial fibrillation (Multi)     CKD (chronic kidney disease)     Developmental delay     Gout     HTN (hypertension)     Parkinson's disease (Multi)     Pericardial effusion ([facility]-HCC) 2020    Schizophrenia        FAMILY HISTORY  No family history on file.    TOBACCO HISTORY  Tobacco Use: Low Risk  (4/21/2025)    Received from [facility]    Patient History     Smoking Tobacco Use: Never     Smokeless Tobacco Use: Never     Passive Exposure: Not on file       SOCIAL HISTORY  Social Connections: Not on File (9/12/2024)    Received from [facility]    Social Connections     Connectedness: 0        Outpatient Medication Profile:  Current Outpatient Medications on File Prior to Visit   Medication Sig Dispense Refill    acetaminophen  (Tylenol) 325 mg capsule Take 1 capsule (325 mg) by mouth every 6 hours if needed for mild pain (1 - 3) or headaches.      allopurinol (Zyloprim) 100 mg tablet Take 1 tablet (100 mg) by mouth once daily.      apixaban (Eliquis) 5 mg tablet Take 1 tablet (5 mg) by mouth 2 times a day. Last taken on 1/13      carbidopa-levodopa (Sinemet)  mg tablet Take 1.5 tablets by mouth 3 times a day.      cariprazine (Vraylar) 6 mg capsule Take 1 capsule (6 mg) by mouth once daily.      clonazePAM (KlonoPIN) 0.5 mg tablet Take 1 tablet (0.5 mg) by mouth 3 times a day.      ergocalciferol (Vitamin D-2) 200 mcg/mL (8,000 unit/mL) drops Take 50,000 Units by mouth 1 (one) time per week.      ezetimibe (Zetia) 10 mg tablet Take 1 tablet (10 mg) by mouth once daily.      famotidine (Pepcid) 20 mg tablet Take 1 tablet (20 mg) by mouth 2 times a day. 60 tablet 0    fluticasone (Flonase) 50 mcg/actuation nasal spray Administer 2 sprays into each nostril once daily.      furosemide (Lasix) 20 mg tablet Take 1 tablet (20 mg) by mouth once daily.      guaiFENesin (Mucinex) 600 mg 12 hr tablet Take 1 tablet (600 mg) by mouth every 12 hours if needed for congestion.      LACTOBACILLUS ACIDOPHILUS ORAL Take 2 capsules by mouth twice a day.      losartan (Cozaar) 50 mg tablet Take 1 tablet (50 mg) by mouth once daily.      metFORMIN (Glucophage) 500 mg tablet Take 1 tablet (500 mg) by mouth once daily in the evening. Take with meals.      metoprolol tartrate (Lopressor) 50 mg tablet Take 1 tablet by mouth twice a day.      OLANZapine (ZyPREXA) 5 mg tablet Take 1 tablet (5 mg) by mouth once daily in the morning.      OLANZapine zydis (ZyPREXA) 5 mg disintegrating tablet Dissolve 1 tablet (5 mg) in the mouth once daily as needed (Anxiety).      potassium chloride CR 10 mEq ER tablet Take 1 tablet (10 mEq) by mouth once daily.      pravastatin (Pravachol) 40 mg tablet Take 1 tablet (40 mg) by mouth once daily at bedtime.      psyllium husk  (MetamuciL) 3.4 gram/5.4 gram powder Take 1 tsp by mouth once daily.      QUEtiapine (SEROquel) 400 mg tablet Take 1 tablet (400 mg) by mouth once daily at bedtime.      tamsulosin (Flomax) 0.4 mg 24 hr capsule Take 1 capsule (0.4 mg) by mouth once daily at bedtime.      valproate (Depakene) 250 mg/5 mL oral solution Take 10 mL (500 mg) by mouth once daily in the morning.      valproate (Depakene) 250 mg/5 mL oral solution Take 20 mL (1,000 mg) by mouth once daily at bedtime.       No current facility-administered medications on file prior to visit.         Performance Status:  Asymptomatic     Vitals and Measurements:   There were no vitals taken for this visit.      Physical Exam:   General: Appears well and in NAD  Eyes: PERRL, EOMI, clear sclera  ENMT: mucous membranes moist, no apparent injury, no lesions seen  Head/Neck: Neck supple, no apparent injury, thyroid without mass or tenderness, No JVD, trachea midline,   Thorax: Patent airways, CTAB, normal breath sounds with good chest expansion, thorax symmetric  Cardiovascular: Regular, rate and rhythm, no murmurs, 2+ equal pulses of the extremities, normal S 1and S 2  Gastrointestinal: Nondistended, soft, non-tender, no rebound tenderness or guarding, no masses palpable, no organomegaly, +BS  Musculoskeletal: ROM intact, no joint swelling, normal strength  Extremities: normal extremities, no cyanosis edema, contusions or wounds, no clubbing  Neurological: alert and oriented x3, intact senses, motor, response and reflexes, normal strength  Lymphatic: No significant lymphadenopathy  Psychological: Appropriate mood and behavior  Skin: Warm and dry, no lesions, no rashes         Lab Results:  I have reviewed these laboratory results:     Lab Results   Component Value Date    WBC 6.4 04/09/2025    HGB 14.9 04/09/2025    HCT 47.4 04/09/2025    MCV 95 04/09/2025     04/09/2025         Chemistry    Lab Results   Component Value Date/Time     04/09/2025 1000  "    02/07/2025 1606    K 4.1 04/09/2025 1000    K 4.8 02/07/2025 1606     04/09/2025 1000     02/07/2025 1606    CO2 27 04/09/2025 1000    CO2 24 02/07/2025 1606    BUN 21 04/09/2025 1000    BUN 24 02/07/2025 1606    CREATININE 1.64 (H) 04/09/2025 1000    CREATININE 1.63 (H) 02/07/2025 1606    Lab Results   Component Value Date/Time    CALCIUM 9.6 04/09/2025 1000    CALCIUM 9.6 02/07/2025 1606    ALKPHOS 80 04/09/2025 1000    ALKPHOS 85 02/07/2025 1606    AST 17 04/09/2025 1000    AST 22 02/07/2025 1606    ALT 5 (L) 04/09/2025 1000    ALT 22 02/07/2025 1606    BILITOT 0.5 04/09/2025 1000    BILITOT 0.4 02/07/2025 1606            Lab Results   Component Value Date    TSH 2.38 08/28/2020        Radiology Result:  I have reviewed the latest Imaging in PACS and the findings are noted in this note. I discussed the results of the latest imaging with the patient. All previous imaging were reviewed at the time it was completed. Full records are available in the EMR for review as well.     === 02/14/25 ===    CT PANCREAS PRE OP EVALUATION WITH CONTRAST    Addendum 2/17/2025  2:28 PM ------------------------------------------------  Interpreted By:  Marquez Howell,  ADDENDUM:  A report error has come to my attention. The 2nd impression should  read \"2. Multiple subtle progressively enhancing liver nodules in the  setting of background steatosis and potential cirrhosis. The pattern  is suspicious for metastasis although nonspecific. Correlation with  Eovist MRI and/or tissue sampling recommended.\"    Signed by: Marquez Howell 2/17/2025 2:28 PM    -------- ORIGINAL REPORT --------  Dictation workstation:   [id number]    - Impression -  1.  Findings favoring progression of poorly delineated elongated  progressively enhancing masslike observation involving the pancreas  with contiguous extension involving the adjacent stomach and left  adrenal gland, encasement of the splenic artery in likely occlusion  of " the splenic vein with collaterals including intragastric venous  collateral.  2. Multiple subtle pancreatic progressively enhancing nodules in the  setting of background steatosis the potential cirrhosis. The pattern  is suspicious for metastases although nonspecific. Correlation with  Eovist MRI and/or tissue sampling recommended.  3. Increased retroperitoneal lymph nodes, likely metastatic.    Signed by: Marquez Howell 2/17/2025 11:36 AM  Dictation workstation:   [id number]    === 05/08/24 ===    MR MRCP WITH PANCREAS WO AND W CONTRAST    - Impression -  1.  6 cm hypoenhancing region in the pancreatic body/tail which  appears masslike with encasement and occlusion of the splenic vein.  These imaging features can be seen with adenocarcinoma, however there  are also some imaging features which are not entirely consistent with  this. For example there appears to be delayed enhancement within this  region as well as no restricted diffusion. This could also be related  to fibrosis secondary to chronic pancreatitis. There do not appear  findings of acute pancreatitis at this time. Please note evaluation  on this exam is severely limited due to motion artifact. Consider  endoscopic ultrasound with tissue sampling for further evaluation.  2. Hepatic steatosis.  3. No definite metastatic disease of the abdomen otherwise.      MACRO:  None    Signed by: Shorty Peace 5/10/2024 9:37 AM  Dictation workstation:   [id number]    Pathology Results:  I have reviewed the full pathology report recorded in the EMR. The pertinent portions indicating diagnosis are listed here in the note. for details please refer to the full report recorded in the EMR.    Assessment and Plan:     Metastatic well diff. PanNET  75 y.o. male presenting with pancreatic body mass.   05/08/2024: CT AP Expansion of the distal pancreatic body and tail with hypoenhancement and mild surrounding edema  05/09/2024: MRCP showed 6 cm hypoenhancing region in the  pancreatic body/tail   01/17/2025: EUS w biopsy showed G2 well diff. NET with Ki67 3.8%  02/20/2025: PET-Ga68 showed Somatostatin receptor expressing mass within the pancreatic body , metastatic disease within the abdomen including multiple liver lesions, a left adrenal gland, a splenic hilar lesion, and an omental nodule, and left para-aortic lymph nodes.  We discussed that CapTem is not a good option given his development delay,  Pt started on SSAs  05/2024: Restaging MRI showed SD compared to 02/2025  Today: No abdominal pain,no nausea or vomiting. No change in BMs    Plan:  - Resume SSA   - Restaging MRI AP in 3 months  - RTC in 3 months for follow up          DISCLAIMER:   In preparing for this visit and writing this note, I reviewed all the previous electronic medical records (labs, imaging and medical charts) of the patient available in the physician portal. Significant findings which helped in decision making are recorded  in this chart.     The plan was discussed with the patient. We gave him ample opportunities to ask questions. All questions were answered to his satisfaction and he verbalized understanding.       Marcela Shrestha M.D.   and Director of the Neuroendocrine Tumor Program  Gastrointestinal Medical Oncology  Department of Hematology and Oncology  [facility], [place] [address]  Phone (Office): [phone]  Fax:  [phone]   Email: [email or web address]  Learn more about [facility] Comprehensive Neuroendocrine Tumor Program: Click Here  Learn more about [place] [employer]: [email or web address]

## 2025-06-04 ENCOUNTER — INFUSION (OUTPATIENT)
Dept: HEMATOLOGY/ONCOLOGY | Facility: CLINIC | Age: 75
End: 2025-06-04
Payer: COMMERCIAL

## 2025-06-04 VITALS
WEIGHT: 219.36 LBS | SYSTOLIC BLOOD PRESSURE: 127 MMHG | HEART RATE: 64 BPM | BODY MASS INDEX: 33.63 KG/M2 | TEMPERATURE: 98.1 F | OXYGEN SATURATION: 97 % | DIASTOLIC BLOOD PRESSURE: 81 MMHG | RESPIRATION RATE: 17 BRPM

## 2025-06-04 DIAGNOSIS — D3A.8 NEUROENDOCRINE TUMOR OF PANCREAS: ICD-10-CM

## 2025-06-04 PROCEDURE — 96372 THER/PROPH/DIAG INJ SC/IM: CPT

## 2025-06-04 PROCEDURE — 2500000004 HC RX 250 GENERAL PHARMACY W/ HCPCS (ALT 636 FOR OP/ED): Mod: JZ,TB | Performed by: INTERNAL MEDICINE

## 2025-06-04 RX ORDER — DIPHENHYDRAMINE HYDROCHLORIDE 50 MG/ML
50 INJECTION, SOLUTION INTRAMUSCULAR; INTRAVENOUS AS NEEDED
OUTPATIENT
Start: 2025-07-02

## 2025-06-04 RX ORDER — LANREOTIDE ACETATE 120 MG/.5ML
120 INJECTION SUBCUTANEOUS ONCE
Status: COMPLETED | OUTPATIENT
Start: 2025-06-04 | End: 2025-06-04

## 2025-06-04 RX ORDER — FAMOTIDINE 10 MG/ML
20 INJECTION, SOLUTION INTRAVENOUS ONCE AS NEEDED
Status: DISCONTINUED | OUTPATIENT
Start: 2025-06-04 | End: 2025-06-04 | Stop reason: HOSPADM

## 2025-06-04 RX ORDER — EPINEPHRINE 0.3 MG/.3ML
0.3 INJECTION SUBCUTANEOUS EVERY 5 MIN PRN
OUTPATIENT
Start: 2025-07-02

## 2025-06-04 RX ORDER — FAMOTIDINE 10 MG/ML
20 INJECTION, SOLUTION INTRAVENOUS ONCE AS NEEDED
OUTPATIENT
Start: 2025-07-02

## 2025-06-04 RX ORDER — DIPHENHYDRAMINE HYDROCHLORIDE 50 MG/ML
50 INJECTION, SOLUTION INTRAMUSCULAR; INTRAVENOUS AS NEEDED
Status: DISCONTINUED | OUTPATIENT
Start: 2025-06-04 | End: 2025-06-04 | Stop reason: HOSPADM

## 2025-06-04 RX ORDER — ALBUTEROL SULFATE 0.83 MG/ML
3 SOLUTION RESPIRATORY (INHALATION) AS NEEDED
OUTPATIENT
Start: 2025-07-02

## 2025-06-04 RX ORDER — EPINEPHRINE 0.3 MG/.3ML
0.3 INJECTION SUBCUTANEOUS EVERY 5 MIN PRN
Status: DISCONTINUED | OUTPATIENT
Start: 2025-06-04 | End: 2025-06-04 | Stop reason: HOSPADM

## 2025-06-04 RX ORDER — ALBUTEROL SULFATE 0.83 MG/ML
3 SOLUTION RESPIRATORY (INHALATION) AS NEEDED
Status: DISCONTINUED | OUTPATIENT
Start: 2025-06-04 | End: 2025-06-04 | Stop reason: HOSPADM

## 2025-06-04 RX ORDER — LANREOTIDE ACETATE 120 MG/.5ML
120 INJECTION SUBCUTANEOUS ONCE
OUTPATIENT
Start: 2025-07-02

## 2025-06-04 RX ADMIN — LANREOTIDE ACETATE 120 MG: 120 INJECTION SUBCUTANEOUS at 11:08

## 2025-06-04 ASSESSMENT — PAIN SCALES - GENERAL: PAINLEVEL_OUTOF10: 0-NO PAIN

## 2025-06-04 NOTE — PROGRESS NOTES
Goal Outcome Evaluation:         Pt alert and oriented X4, able to make needs known. No c/o chest pain, SOB, N/V. Takes pills whole with thin liquids. PRN oxycodone given 1x this shift. Participated in therapy. Had shower with OT this morning. Call light at reach. Will continue with POC.          Patient's most recent vital signs are:     Vital signs:  BP: 141/66  Temp: 97.5  HR: 73  RR: 18  SpO2: 96 %     Patient does not have new respiratory symptoms.  Patient does not have new sore throat.  Patient does not have a fever greater than 99.5.                Patient tolerated Lantreotide injection well, left via w/c in stable condition.

## 2025-07-02 ENCOUNTER — INFUSION (OUTPATIENT)
Dept: HEMATOLOGY/ONCOLOGY | Facility: CLINIC | Age: 75
End: 2025-07-02
Payer: COMMERCIAL

## 2025-07-02 VITALS
BODY MASS INDEX: 33.01 KG/M2 | RESPIRATION RATE: 16 BRPM | TEMPERATURE: 98.6 F | DIASTOLIC BLOOD PRESSURE: 79 MMHG | SYSTOLIC BLOOD PRESSURE: 152 MMHG | WEIGHT: 215.28 LBS | OXYGEN SATURATION: 97 % | HEART RATE: 75 BPM

## 2025-07-02 DIAGNOSIS — D3A.8 NEUROENDOCRINE TUMOR OF PANCREAS: ICD-10-CM

## 2025-07-02 PROCEDURE — 96372 THER/PROPH/DIAG INJ SC/IM: CPT

## 2025-07-02 PROCEDURE — 2500000004 HC RX 250 GENERAL PHARMACY W/ HCPCS (ALT 636 FOR OP/ED): Mod: JZ,TB | Performed by: INTERNAL MEDICINE

## 2025-07-02 RX ORDER — FAMOTIDINE 10 MG/ML
20 INJECTION, SOLUTION INTRAVENOUS ONCE AS NEEDED
Status: DISCONTINUED | OUTPATIENT
Start: 2025-07-02 | End: 2025-07-02 | Stop reason: HOSPADM

## 2025-07-02 RX ORDER — ALBUTEROL SULFATE 0.83 MG/ML
3 SOLUTION RESPIRATORY (INHALATION) AS NEEDED
Status: DISCONTINUED | OUTPATIENT
Start: 2025-07-02 | End: 2025-07-02 | Stop reason: HOSPADM

## 2025-07-02 RX ORDER — EPINEPHRINE 0.3 MG/.3ML
0.3 INJECTION SUBCUTANEOUS EVERY 5 MIN PRN
OUTPATIENT
Start: 2025-07-30

## 2025-07-02 RX ORDER — LANREOTIDE ACETATE 120 MG/.5ML
120 INJECTION SUBCUTANEOUS ONCE
Status: COMPLETED | OUTPATIENT
Start: 2025-07-02 | End: 2025-07-02

## 2025-07-02 RX ORDER — FAMOTIDINE 10 MG/ML
20 INJECTION, SOLUTION INTRAVENOUS ONCE AS NEEDED
OUTPATIENT
Start: 2025-07-30

## 2025-07-02 RX ORDER — DIPHENHYDRAMINE HYDROCHLORIDE 50 MG/ML
50 INJECTION, SOLUTION INTRAMUSCULAR; INTRAVENOUS AS NEEDED
OUTPATIENT
Start: 2025-07-30

## 2025-07-02 RX ORDER — LANREOTIDE ACETATE 120 MG/.5ML
120 INJECTION SUBCUTANEOUS ONCE
OUTPATIENT
Start: 2025-07-30

## 2025-07-02 RX ORDER — EPINEPHRINE 0.3 MG/.3ML
0.3 INJECTION SUBCUTANEOUS EVERY 5 MIN PRN
Status: DISCONTINUED | OUTPATIENT
Start: 2025-07-02 | End: 2025-07-02 | Stop reason: HOSPADM

## 2025-07-02 RX ORDER — DIPHENHYDRAMINE HYDROCHLORIDE 50 MG/ML
50 INJECTION, SOLUTION INTRAMUSCULAR; INTRAVENOUS AS NEEDED
Status: DISCONTINUED | OUTPATIENT
Start: 2025-07-02 | End: 2025-07-02 | Stop reason: HOSPADM

## 2025-07-02 RX ORDER — ALBUTEROL SULFATE 0.83 MG/ML
3 SOLUTION RESPIRATORY (INHALATION) AS NEEDED
OUTPATIENT
Start: 2025-07-30

## 2025-07-02 RX ADMIN — LANREOTIDE ACETATE 120 MG: 120 INJECTION SUBCUTANEOUS at 15:32

## 2025-07-02 ASSESSMENT — PAIN SCALES - GENERAL: PAINLEVEL_OUTOF10: 0-NO PAIN

## 2025-07-02 NOTE — PROGRESS NOTES
Learner: caregiver  Educated on: Lanreotide  Readiness: acceptance  Preferred learning method: preferred: listening  Method used: explanation and handout  Response: demonstrated understanding and needs reinforcement  Barriers: None  Preferred language: English  Resources given: AVS  Patient unable to understand education. Education given solely to caregiver    Nikhil Underwood completed Lanreotide treatment without any issues, tolerated well and is aware of follow-up appointments. No questions or concerns at this time. DC ambulatory with caregiver.

## 2025-07-30 ENCOUNTER — APPOINTMENT (OUTPATIENT)
Dept: HEMATOLOGY/ONCOLOGY | Facility: HOSPITAL | Age: 75
End: 2025-07-30
Payer: COMMERCIAL

## 2025-07-30 ENCOUNTER — APPOINTMENT (OUTPATIENT)
Dept: HEMATOLOGY/ONCOLOGY | Facility: CLINIC | Age: 75
End: 2025-07-30
Payer: COMMERCIAL

## 2025-07-30 NOTE — PROGRESS NOTES
This RN called pt/caregiver when he did not show for his appt.  They did not realize the appt was active until they received a reminder call this am and proceeded to call-in and notify [facility].  This RN cannot see a telephone note, however has engaged the Charge RN to assist with rescheduling the pt for Fri when the pt is available.  Pt will be notified soon to confirm the change.

## 2025-08-01 ENCOUNTER — INFUSION (OUTPATIENT)
Dept: HEMATOLOGY/ONCOLOGY | Facility: CLINIC | Age: 75
End: 2025-08-01
Payer: COMMERCIAL

## 2025-08-01 VITALS
BODY MASS INDEX: 31.87 KG/M2 | SYSTOLIC BLOOD PRESSURE: 160 MMHG | WEIGHT: 207.89 LBS | TEMPERATURE: 97.9 F | RESPIRATION RATE: 20 BRPM | HEART RATE: 71 BPM | DIASTOLIC BLOOD PRESSURE: 88 MMHG | OXYGEN SATURATION: 98 %

## 2025-08-01 DIAGNOSIS — D3A.8 NEUROENDOCRINE TUMOR OF PANCREAS: ICD-10-CM

## 2025-08-01 PROCEDURE — 2500000004 HC RX 250 GENERAL PHARMACY W/ HCPCS (ALT 636 FOR OP/ED): Mod: JZ,TB | Performed by: INTERNAL MEDICINE

## 2025-08-01 PROCEDURE — 96372 THER/PROPH/DIAG INJ SC/IM: CPT

## 2025-08-01 RX ORDER — ALBUTEROL SULFATE 0.83 MG/ML
3 SOLUTION RESPIRATORY (INHALATION) AS NEEDED
OUTPATIENT
Start: 2025-08-27

## 2025-08-01 RX ORDER — FAMOTIDINE 10 MG/ML
20 INJECTION, SOLUTION INTRAVENOUS ONCE AS NEEDED
Status: DISCONTINUED | OUTPATIENT
Start: 2025-08-01 | End: 2025-08-01 | Stop reason: HOSPADM

## 2025-08-01 RX ORDER — EPINEPHRINE 0.3 MG/.3ML
0.3 INJECTION SUBCUTANEOUS EVERY 5 MIN PRN
OUTPATIENT
Start: 2025-08-27

## 2025-08-01 RX ORDER — FAMOTIDINE 10 MG/ML
20 INJECTION, SOLUTION INTRAVENOUS ONCE AS NEEDED
OUTPATIENT
Start: 2025-08-27

## 2025-08-01 RX ORDER — ALBUTEROL SULFATE 0.83 MG/ML
3 SOLUTION RESPIRATORY (INHALATION) AS NEEDED
Status: DISCONTINUED | OUTPATIENT
Start: 2025-08-01 | End: 2025-08-01 | Stop reason: HOSPADM

## 2025-08-01 RX ORDER — LANREOTIDE ACETATE 120 MG/.5ML
120 INJECTION SUBCUTANEOUS ONCE
OUTPATIENT
Start: 2025-08-27

## 2025-08-01 RX ORDER — DIPHENHYDRAMINE HYDROCHLORIDE 50 MG/ML
50 INJECTION, SOLUTION INTRAMUSCULAR; INTRAVENOUS AS NEEDED
OUTPATIENT
Start: 2025-08-27

## 2025-08-01 RX ORDER — DIPHENHYDRAMINE HYDROCHLORIDE 50 MG/ML
50 INJECTION, SOLUTION INTRAMUSCULAR; INTRAVENOUS AS NEEDED
Status: DISCONTINUED | OUTPATIENT
Start: 2025-08-01 | End: 2025-08-01 | Stop reason: HOSPADM

## 2025-08-01 RX ORDER — EPINEPHRINE 0.3 MG/.3ML
0.3 INJECTION SUBCUTANEOUS EVERY 5 MIN PRN
Status: DISCONTINUED | OUTPATIENT
Start: 2025-08-01 | End: 2025-08-01 | Stop reason: HOSPADM

## 2025-08-01 RX ORDER — LANREOTIDE ACETATE 120 MG/.5ML
120 INJECTION SUBCUTANEOUS ONCE
Status: COMPLETED | OUTPATIENT
Start: 2025-08-01 | End: 2025-08-01

## 2025-08-01 RX ADMIN — LANREOTIDE ACETATE 120 MG: 120 INJECTION SUBCUTANEOUS at 10:10

## 2025-08-01 ASSESSMENT — PAIN SCALES - GENERAL: PAINLEVEL_OUTOF10: 0-NO PAIN

## 2025-08-01 NOTE — PROGRESS NOTES
Patient tolerated injection well and without incident. Reviewed patient's schedule with care giver and her verbalized understanding.

## 2025-08-01 NOTE — PROGRESS NOTES
Education Documentation  When to Call the Provider, taught by Naya Zambrano RN at 8/1/2025  4:10 PM.  Learner: Patient  Readiness: Acceptance  Method: Explanation  Response: Verbalizes Understanding    Homegoing Instructions, taught by Naya Zambrano RN at 8/1/2025  4:10 PM.  Learner: Patient  Readiness: Acceptance  Method: Explanation  Response: Verbalizes Understanding    Duration of Therapy, taught by Naya Zambrano RN at 8/1/2025  4:10 PM.  Learner: Patient  Readiness: Acceptance  Method: Explanation  Response: Verbalizes Understanding    Administration Schedule, taught by Naya Zambrano RN at 8/1/2025  4:10 PM.  Learner: Patient  Readiness: Acceptance  Method: Explanation  Response: Verbalizes Understanding    How to Administer, taught by Naya Zambrano RN at 8/1/2025  4:10 PM.  Learner: Patient  Readiness: Acceptance  Method: Explanation  Response: Verbalizes Understanding    Dose, taught by Naya Zambrano RN at 8/1/2025  4:10 PM.  Learner: Patient  Readiness: Acceptance  Method: Explanation  Response: Verbalizes Understanding    Common Side Effects, taught by Naya Zambrano RN at 8/1/2025  4:10 PM.  Learner: Patient  Readiness: Acceptance  Method: Explanation  Response: Verbalizes Understanding    Reason for Use / Actions, taught by Naya Zambrano RN at 8/1/2025  4:10 PM.  Learner: Patient  Readiness: Acceptance  Method: Explanation  Response: Verbalizes Understanding    Type of Medication, taught by Naya Zambrano RN at 8/1/2025  4:10 PM.  Learner: Patient  Readiness: Acceptance  Method: Explanation  Response: Verbalizes Understanding    Returning to Work/School/Activities, taught by Naya Zambrano RN at 8/1/2025  4:10 PM.  Learner: Patient  Readiness: Acceptance  Method: Explanation  Response: Verbalizes Understanding    Stress Management, taught by Naya Zambrano RN at 8/1/2025  4:10 PM.  Learner: Patient  Readiness: Acceptance  Method: Explanation  Response: Verbalizes Understanding    Changing Role with Self and Family,  taught by Naya Zambrano RN at 8/1/2025  4:10 PM.  Learner: Patient  Readiness: Acceptance  Method: Explanation  Response: Verbalizes Understanding    Leisure Education, taught by Naya Zambrano RN at 8/1/2025  4:10 PM.  Learner: Patient  Readiness: Acceptance  Method: Explanation  Response: Verbalizes Understanding    Health Systems & Community Resources, taught by Naya Zambrano RN at 8/1/2025  4:10 PM.  Learner: Patient  Readiness: Acceptance  Method: Explanation  Response: Verbalizes Understanding    Advanced Medical Directives, taught by Naya Zambrano RN at 8/1/2025  4:10 PM.  Learner: Patient  Readiness: Acceptance  Method: Explanation  Response: Verbalizes Understanding    Escort, Parking, Transportation Home, taught by Naya Zambrano RN at 8/1/2025  4:10 PM.  Learner: Patient  Readiness: Acceptance  Method: Explanation  Response: Verbalizes Understanding    Referrals to Support Services, taught by Naya Zambrano RN at 8/1/2025  4:10 PM.  Learner: Patient  Readiness: Acceptance  Method: Explanation  Response: Verbalizes Understanding    Support Systems, taught by Naya Zambrano RN at 8/1/2025  4:10 PM.  Learner: Patient  Readiness: Acceptance  Method: Explanation  Response: Verbalizes Understanding    Financial Assistance Information, taught by Naya Zambrano RN at 8/1/2025  4:10 PM.  Learner: Patient  Readiness: Acceptance  Method: Explanation  Response: Verbalizes Understanding    Financial Benefits Summary, taught by Naya Zambrano RN at 8/1/2025  4:10 PM.  Learner: Patient  Readiness: Acceptance  Method: Explanation  Response: Verbalizes Understanding    Healthy Lifestyle, taught by Naya Zambrano RN at 8/1/2025  4:10 PM.  Learner: Patient  Readiness: Acceptance  Method: Explanation  Response: Verbalizes Understanding    Monitoring Weight, taught by Naya Zambrano RN at 8/1/2025  4:10 PM.  Learner: Patient  Readiness: Acceptance  Method: Explanation  Response: Verbalizes Understanding    Tips for Daily Living, taught by Naya  JODY Zambrano at 8/1/2025  4:10 PM.  Learner: Patient  Readiness: Acceptance  Method: Explanation  Response: Verbalizes Understanding    Nutrition/Diet, taught by Naya Zambrano RN at 8/1/2025  4:10 PM.  Learner: Patient  Readiness: Acceptance  Method: Explanation  Response: Verbalizes Understanding    Food-Drug Interactions, taught by Naya Zambrano RN at 8/1/2025  4:10 PM.  Learner: Patient  Readiness: Acceptance  Method: Explanation  Response: Verbalizes Understanding    Nutrition Related Education, taught by Naya Zambrano RN at 8/1/2025  4:10 PM.  Learner: Patient  Readiness: Acceptance  Method: Explanation  Response: Verbalizes Understanding    Pain Medication Actions & Side Effects, taught by Naya Zambrano RN at 8/1/2025  4:10 PM.  Learner: Patient  Readiness: Acceptance  Method: Explanation  Response: Verbalizes Understanding    Patient Controlled Analgesia, taught by Naya Zambrano RN at 8/1/2025  4:10 PM.  Learner: Patient  Readiness: Acceptance  Method: Explanation  Response: Verbalizes Understanding    Discuss the Use of Pain Control Measures Before Pain Becomes Severe, taught by Naya Zambrano RN at 8/1/2025  4:10 PM.  Learner: Patient  Readiness: Acceptance  Method: Explanation  Response: Verbalizes Understanding    Pain Management, taught by Naya Zambrano RN at 8/1/2025  4:10 PM.  Learner: Patient  Readiness: Acceptance  Method: Explanation  Response: Verbalizes Understanding    Pain Rating Scale, taught by Naya Zambrano RN at 8/1/2025  4:10 PM.  Learner: Patient  Readiness: Acceptance  Method: Explanation  Response: Verbalizes Understanding    Shortness of Breath, taught by Naya Zambrano RN at 8/1/2025  4:10 PM.  Learner: Patient  Readiness: Acceptance  Method: Explanation  Response: Verbalizes Understanding    Changes in Appetite, taught by Naya Zambrano RN at 8/1/2025  4:10 PM.  Learner: Patient  Readiness: Acceptance  Method: Explanation  Response: Verbalizes Understanding    Fertility Issues, taught by Naya Zambrano  RN at 8/1/2025  4:10 PM.  Learner: Patient  Readiness: Acceptance  Method: Explanation  Response: Verbalizes Understanding    Memory Problems, taught by Naya Zambrano RN at 8/1/2025  4:10 PM.  Learner: Patient  Readiness: Acceptance  Method: Explanation  Response: Verbalizes Understanding    Skin and Nail Changes, taught by Naya Zambrano RN at 8/1/2025  4:10 PM.  Learner: Patient  Readiness: Acceptance  Method: Explanation  Response: Verbalizes Understanding    Changes in Urination, taught by Naya Zambrano RN at 8/1/2025  4:10 PM.  Learner: Patient  Readiness: Acceptance  Method: Explanation  Response: Verbalizes Understanding    Bleeding Precautions, taught by Naya Zambrano RN at 8/1/2025  4:10 PM.  Learner: Patient  Readiness: Acceptance  Method: Explanation  Response: Verbalizes Understanding    Edema Management, taught by Naya Zambrano RN at 8/1/2025  4:10 PM.  Learner: Patient  Readiness: Acceptance  Method: Explanation  Response: Verbalizes Understanding    Care of Neuropathy, taught by Naya Zambrano RN at 8/1/2025  4:10 PM.  Learner: Patient  Readiness: Acceptance  Method: Explanation  Response: Verbalizes Understanding    Infection Control, taught by Naya Zambrano RN at 8/1/2025  4:10 PM.  Learner: Patient  Readiness: Acceptance  Method: Explanation  Response: Verbalizes Understanding    Alopecia, taught by Naya Zambrano RN at 8/1/2025  4:10 PM.  Learner: Patient  Readiness: Acceptance  Method: Explanation  Response: Verbalizes Understanding    Diarrhea, taught by Naya Zambrano RN at 8/1/2025  4:10 PM.  Learner: Patient  Readiness: Acceptance  Method: Explanation  Response: Verbalizes Understanding    Constipation, taught by Naya Zambrano RN at 8/1/2025  4:10 PM.  Learner: Patient  Readiness: Acceptance  Method: Explanation  Response: Verbalizes Understanding    Mouth Sores, taught by Naya Zambrano RN at 8/1/2025  4:10 PM.  Learner: Patient  Readiness: Acceptance  Method: Explanation  Response: Verbalizes  Understanding    Nausea Management, taught by Naya Zambrano RN at 8/1/2025  4:10 PM.  Learner: Patient  Readiness: Acceptance  Method: Explanation  Response: Verbalizes Understanding    Fatigue, taught by Naya Zambrano RN at 8/1/2025  4:10 PM.  Learner: Patient  Readiness: Acceptance  Method: Explanation  Response: Verbalizes Understanding    Lanreotide, taught by Naya Zambrano RN at 8/1/2025  4:10 PM.  Learner: Patient  Readiness: Acceptance  Method: Explanation  Response: Verbalizes Understanding    Treatment Plan and Schedule, taught by Naya Zambrano RN at 8/1/2025  4:10 PM.  Learner: Patient  Readiness: Acceptance  Method: Explanation  Response: Verbalizes Understanding    General Medication Information, taught by Naya Zambrano RN at 8/1/2025  4:10 PM.  Learner: Patient  Readiness: Acceptance  Method: Explanation  Response: Verbalizes Understanding    Supportive Medications, taught by Naya Zambrano RN at 8/1/2025  4:10 PM.  Learner: Patient  Readiness: Acceptance  Method: Explanation  Response: Verbalizes Understanding    Education Comments  No comments found.

## 2025-08-26 ENCOUNTER — TELEPHONE (OUTPATIENT)
Dept: HEMATOLOGY/ONCOLOGY | Facility: HOSPITAL | Age: 75
End: 2025-08-26
Payer: COMMERCIAL

## 2025-08-27 ENCOUNTER — INFUSION (OUTPATIENT)
Dept: HEMATOLOGY/ONCOLOGY | Facility: CLINIC | Age: 75
End: 2025-08-27
Payer: COMMERCIAL

## 2025-08-27 VITALS
HEART RATE: 71 BPM | RESPIRATION RATE: 17 BRPM | OXYGEN SATURATION: 97 % | TEMPERATURE: 96.8 F | SYSTOLIC BLOOD PRESSURE: 140 MMHG | BODY MASS INDEX: 32.28 KG/M2 | DIASTOLIC BLOOD PRESSURE: 77 MMHG | WEIGHT: 210.54 LBS

## 2025-08-27 DIAGNOSIS — D3A.8 NEUROENDOCRINE TUMOR OF PANCREAS: ICD-10-CM

## 2025-08-27 LAB
ALBUMIN SERPL BCP-MCNC: 3.9 G/DL (ref 3.4–5)
ALP SERPL-CCNC: 71 U/L (ref 33–136)
ALT SERPL W P-5'-P-CCNC: 6 U/L (ref 10–52)
ANION GAP SERPL CALC-SCNC: 14 MMOL/L (ref 10–20)
AST SERPL W P-5'-P-CCNC: 12 U/L (ref 9–39)
BASOPHILS # BLD AUTO: 0.01 X10*3/UL (ref 0–0.1)
BASOPHILS NFR BLD AUTO: 0.2 %
BILIRUB SERPL-MCNC: 0.5 MG/DL (ref 0–1.2)
BUN SERPL-MCNC: 13 MG/DL (ref 6–23)
CALCIUM SERPL-MCNC: 8.7 MG/DL (ref 8.6–10.3)
CHLORIDE SERPL-SCNC: 105 MMOL/L (ref 98–107)
CO2 SERPL-SCNC: 23 MMOL/L (ref 21–32)
CREAT SERPL-MCNC: 1.38 MG/DL (ref 0.5–1.3)
EGFRCR SERPLBLD CKD-EPI 2021: 54 ML/MIN/1.73M*2
EOSINOPHIL # BLD AUTO: 0.27 X10*3/UL (ref 0–0.4)
EOSINOPHIL NFR BLD AUTO: 5.4 %
ERYTHROCYTE [DISTWIDTH] IN BLOOD BY AUTOMATED COUNT: 14.2 % (ref 11.5–14.5)
GLUCOSE SERPL-MCNC: 192 MG/DL (ref 74–99)
HCT VFR BLD AUTO: 41.5 % (ref 41–52)
HGB BLD-MCNC: 13.3 G/DL (ref 13.5–17.5)
IMM GRANULOCYTES # BLD AUTO: 0.01 X10*3/UL (ref 0–0.5)
IMM GRANULOCYTES NFR BLD AUTO: 0.2 % (ref 0–0.9)
LYMPHOCYTES # BLD AUTO: 0.97 X10*3/UL (ref 0.8–3)
LYMPHOCYTES NFR BLD AUTO: 19.5 %
MCH RBC QN AUTO: 31 PG (ref 26–34)
MCHC RBC AUTO-ENTMCNC: 32 G/DL (ref 32–36)
MCV RBC AUTO: 97 FL (ref 80–100)
MONOCYTES # BLD AUTO: 0.36 X10*3/UL (ref 0.05–0.8)
MONOCYTES NFR BLD AUTO: 7.2 %
NEUTROPHILS # BLD AUTO: 3.35 X10*3/UL (ref 1.6–5.5)
NEUTROPHILS NFR BLD AUTO: 67.5 %
NRBC BLD-RTO: 0 /100 WBCS (ref 0–0)
PLATELET # BLD AUTO: 136 X10*3/UL (ref 150–450)
POTASSIUM SERPL-SCNC: 4.1 MMOL/L (ref 3.5–5.3)
PROT SERPL-MCNC: 6.4 G/DL (ref 6.4–8.2)
RBC # BLD AUTO: 4.29 X10*6/UL (ref 4.5–5.9)
SODIUM SERPL-SCNC: 138 MMOL/L (ref 136–145)
WBC # BLD AUTO: 5 X10*3/UL (ref 4.4–11.3)

## 2025-08-27 PROCEDURE — 2500000004 HC RX 250 GENERAL PHARMACY W/ HCPCS (ALT 636 FOR OP/ED): Mod: JZ,TB | Performed by: INTERNAL MEDICINE

## 2025-08-27 PROCEDURE — 80053 COMPREHEN METABOLIC PANEL: CPT

## 2025-08-27 PROCEDURE — 85025 COMPLETE CBC W/AUTO DIFF WBC: CPT

## 2025-08-27 PROCEDURE — 36415 COLL VENOUS BLD VENIPUNCTURE: CPT

## 2025-08-27 PROCEDURE — 96372 THER/PROPH/DIAG INJ SC/IM: CPT

## 2025-08-27 RX ORDER — DIPHENHYDRAMINE HYDROCHLORIDE 50 MG/ML
50 INJECTION, SOLUTION INTRAMUSCULAR; INTRAVENOUS AS NEEDED
Status: DISCONTINUED | OUTPATIENT
Start: 2025-08-27 | End: 2025-08-27 | Stop reason: HOSPADM

## 2025-08-27 RX ORDER — EPINEPHRINE 0.3 MG/.3ML
0.3 INJECTION SUBCUTANEOUS EVERY 5 MIN PRN
OUTPATIENT
Start: 2025-08-29

## 2025-08-27 RX ORDER — FAMOTIDINE 10 MG/ML
20 INJECTION, SOLUTION INTRAVENOUS ONCE AS NEEDED
Status: DISCONTINUED | OUTPATIENT
Start: 2025-08-27 | End: 2025-08-27 | Stop reason: HOSPADM

## 2025-08-27 RX ORDER — DIPHENHYDRAMINE HYDROCHLORIDE 50 MG/ML
50 INJECTION, SOLUTION INTRAMUSCULAR; INTRAVENOUS AS NEEDED
OUTPATIENT
Start: 2025-08-29

## 2025-08-27 RX ORDER — FAMOTIDINE 10 MG/ML
20 INJECTION, SOLUTION INTRAVENOUS ONCE AS NEEDED
OUTPATIENT
Start: 2025-08-29

## 2025-08-27 RX ORDER — ALBUTEROL SULFATE 0.83 MG/ML
3 SOLUTION RESPIRATORY (INHALATION) AS NEEDED
Status: DISCONTINUED | OUTPATIENT
Start: 2025-08-27 | End: 2025-08-27 | Stop reason: HOSPADM

## 2025-08-27 RX ORDER — LANREOTIDE ACETATE 120 MG/.5ML
120 INJECTION SUBCUTANEOUS ONCE
Status: COMPLETED | OUTPATIENT
Start: 2025-08-27 | End: 2025-08-27

## 2025-08-27 RX ORDER — LANREOTIDE ACETATE 120 MG/.5ML
120 INJECTION SUBCUTANEOUS ONCE
OUTPATIENT
Start: 2025-08-29

## 2025-08-27 RX ORDER — ALBUTEROL SULFATE 0.83 MG/ML
3 SOLUTION RESPIRATORY (INHALATION) AS NEEDED
OUTPATIENT
Start: 2025-08-29

## 2025-08-27 RX ORDER — EPINEPHRINE 0.3 MG/.3ML
0.3 INJECTION SUBCUTANEOUS EVERY 5 MIN PRN
Status: DISCONTINUED | OUTPATIENT
Start: 2025-08-27 | End: 2025-08-27 | Stop reason: HOSPADM

## 2025-08-27 RX ADMIN — LANREOTIDE ACETATE 120 MG: 120 INJECTION SUBCUTANEOUS at 11:56

## 2025-08-27 ASSESSMENT — PAIN SCALES - GENERAL: PAINLEVEL_OUTOF10: 0-NO PAIN

## 2025-08-29 ENCOUNTER — HOSPITAL ENCOUNTER (OUTPATIENT)
Dept: RADIOLOGY | Facility: HOSPITAL | Age: 75
Discharge: HOME | End: 2025-08-29
Payer: COMMERCIAL

## 2025-08-29 DIAGNOSIS — D3A.8 NEUROENDOCRINE TUMOR OF PANCREAS: ICD-10-CM

## 2025-08-29 PROCEDURE — 72197 MRI PELVIS W/O & W/DYE: CPT

## 2025-08-29 PROCEDURE — 2550000001 HC RX 255 CONTRASTS: Performed by: INTERNAL MEDICINE

## 2025-08-29 PROCEDURE — A9575 INJ GADOTERATE MEGLUMI 0.1ML: HCPCS | Performed by: INTERNAL MEDICINE

## 2025-08-29 PROCEDURE — 74183 MRI ABD W/O CNTR FLWD CNTR: CPT

## 2025-08-29 RX ORDER — GADOTERATE MEGLUMINE 376.9 MG/ML
0.2 INJECTION INTRAVENOUS
Status: COMPLETED | OUTPATIENT
Start: 2025-08-29 | End: 2025-08-29

## 2025-08-29 RX ADMIN — GADOTERATE MEGLUMINE 19 ML: 376.9 INJECTION INTRAVENOUS at 10:44

## 2025-09-03 ENCOUNTER — OFFICE VISIT (OUTPATIENT)
Dept: HEMATOLOGY/ONCOLOGY | Facility: HOSPITAL | Age: 75
End: 2025-09-03
Payer: COMMERCIAL

## 2025-09-03 VITALS
OXYGEN SATURATION: 97 % | RESPIRATION RATE: 18 BRPM | TEMPERATURE: 97.2 F | WEIGHT: 203.04 LBS | BODY MASS INDEX: 31.13 KG/M2 | HEART RATE: 85 BPM | DIASTOLIC BLOOD PRESSURE: 78 MMHG | SYSTOLIC BLOOD PRESSURE: 132 MMHG

## 2025-09-03 DIAGNOSIS — D3A.8 NEUROENDOCRINE TUMOR OF PANCREAS: ICD-10-CM

## 2025-09-03 DIAGNOSIS — D3A.8 NEUROENDOCRINE TUMOR OF PANCREAS: Primary | ICD-10-CM

## 2025-09-03 PROCEDURE — 99214 OFFICE O/P EST MOD 30 MIN: CPT | Performed by: INTERNAL MEDICINE

## 2025-09-03 PROCEDURE — 1036F TOBACCO NON-USER: CPT | Performed by: INTERNAL MEDICINE

## 2025-09-26 ENCOUNTER — APPOINTMENT (OUTPATIENT)
Dept: HEMATOLOGY/ONCOLOGY | Facility: CLINIC | Age: 75
End: 2025-09-26
Payer: COMMERCIAL